# Patient Record
Sex: FEMALE | Race: WHITE | NOT HISPANIC OR LATINO | Employment: OTHER | URBAN - METROPOLITAN AREA
[De-identification: names, ages, dates, MRNs, and addresses within clinical notes are randomized per-mention and may not be internally consistent; named-entity substitution may affect disease eponyms.]

---

## 2018-03-15 ENCOUNTER — OFFICE VISIT (OUTPATIENT)
Dept: RADIATION ONCOLOGY | Facility: CLINIC | Age: 83
End: 2018-03-15
Payer: MEDICARE

## 2018-03-15 VITALS
WEIGHT: 159.5 LBS | TEMPERATURE: 98 F | HEART RATE: 50 BPM | DIASTOLIC BLOOD PRESSURE: 72 MMHG | RESPIRATION RATE: 18 BRPM | HEIGHT: 67 IN | BODY MASS INDEX: 25.03 KG/M2 | SYSTOLIC BLOOD PRESSURE: 125 MMHG

## 2018-03-15 DIAGNOSIS — C44.319 BASAL CELL CARCINOMA OF FOREHEAD: ICD-10-CM

## 2018-03-15 PROCEDURE — 99213 OFFICE O/P EST LOW 20 MIN: CPT | Mod: ,,, | Performed by: RADIOLOGY

## 2018-03-15 RX ORDER — FERROUS SULFATE, DRIED 160(50) MG
1 TABLET, EXTENDED RELEASE ORAL 2 TIMES DAILY WITH MEALS
COMMUNITY
End: 2020-03-11

## 2018-03-15 RX ORDER — SIMVASTATIN 10 MG/1
10 TABLET, FILM COATED ORAL NIGHTLY
COMMUNITY
End: 2020-03-11 | Stop reason: ALTCHOICE

## 2018-03-15 NOTE — PROGRESS NOTES
Kojo Villa  [id number]  9/20/1934  3/15/2018  [facility]  [address] [address]    DIAGNOSIS: Skin cancer of the for head  REASON FOR VISIT: Routine scheduled follow-up.    HISTORY OF PRESENT ILLNESS:   83-year-old patient with a prior history of carcinoma of the scalp treated a year ago to 2 sites of the scalp located on the vertex and the anterior scalp. She received 50 gray in 20 fractions. She had a complete spot to this treatment.  She is been seen by her dermatologist , who discovered a new lesion on the forehead area. This was biopsied showing basal cell carcinoma.    The patient resides by herself. She is accompanied by her son. He states she does have some memory difficulties but is able to understand my discussion and the side effects of radiation treatment.    INTERVAL HISTORY:   Since the biopsy she has done well. She reports no pain or inflammation with respect to the forehead or scalp area.      Review of Systems   Constitutional: Negative for appetite change and chills.   HENT:   Negative for hearing loss and lump/mass.    Eyes: Negative for eye problems and icterus.   Respiratory: Negative for chest tightness and cough.    Cardiovascular: Negative for chest pain and leg swelling.   Gastrointestinal: Negative for abdominal distention and abdominal pain.   Genitourinary: Negative for dysuria and hematuria.    Musculoskeletal: Negative for arthralgias, back pain and gait problem.   Skin: Negative for itching and rash.   Neurological: Negative for dizziness and gait problem.   Hematological: Negative for adenopathy. Does not bruise/bleed easily.   Psychiatric/Behavioral: Negative for confusion. The patient is not nervous/anxious.      Past Medical History:   Diagnosis Date    Colon polyps     Skin cancer      No past surgical history on file.  Social History     Social History    Marital status:      Spouse name: N/A    Number of  "children: N/A    Years of education: N/A     Social History Main Topics    Smoking status: Never Smoker    Smokeless tobacco: Never Used    Alcohol use 0.6 oz/week     1 Glasses of wine per week      Comment: with dinner occasionally    Drug use: No    Sexual activity: No     Other Topics Concern    None     Social History Narrative    None     Family History   Problem Relation Age of Onset    Cancer Mother     Cancer Sister     Cancer Brother      Medication List with Changes/Refills   Current Medications    CALCIUM-VITAMIN D3 (CALCIUM 500 + D) 500 MG(1,250MG) -200 UNIT PER TABLET    Take 1 tablet by mouth 2 (two) times daily with meals.    SIMVASTATIN (ZOCOR) 10 MG TABLET    Take 10 mg by mouth every evening.     Review of patient's allergies indicates:   Allergen Reactions    Sulfa (sulfonamide antibiotics) Other (See Comments)     Mother told her when she was a child she was allergic       QUALITY OF LIFE: 70%- Cares for Self: Unable to Carry on Normal Activity or Active Work    Vitals:    03/15/18 1002   BP: 125/72   Pulse: (!) 50   Resp: 18   Temp: 97.5 °F (36.4 °C)   TempSrc: Oral   Weight: 72.3 kg (159 lb 8 oz)   Height: 5' 7" (1.702 m)   PainSc: 0-No pain       PHYSICAL EXAM: Oriented alert answers questions well  GENERAL: alert; in no apparent distress.   HEAD: normocephalic, atraumatic.  EYES: pupils are equal, round, reactive to light and accommodation. Sclera anicteric. Conjunctiva not injected.   NOSE/THROAT: no nasal erythema or rhinorrhea. Oropharynx pink, without erythema, ulcerations or thrush.   NECK: no cervical motion rigidity; supple with no masses.  CHEST: clear to auscultation bilaterally; no wheezes, crackles or rubs. Patient is speaking comfortably on room air with normal work of breathing without using accessory muscles of respiration.  CARDIOVASCULAR: regular rate and rhythm; no murmurs, rubs or gallops.  ABDOMEN: soft, nontender, nondistended. Bowel sounds present. "   MUSCULOSKELETAL: no tenderness to palpation along the spine or scapulae. Normal range of motion.  NEUROLOGIC: cranial nerves II-XII intact bilaterally. Strength 5/5 in bilateral upper and lower extremities. No sensory deficits appreciated. Reflexes globally intact. No cerebellar signs. Normal gait.  LYMPHATIC: no cervical, supraclavicular or axillary adenopathy appreciated bilaterally.   EXTREMITIES: no clubbing, cyanosis, edema.  SKIN: no erythema, rashes , prior lesions on the scalp have healed over. There is some hair loss. Some minor subcutaneous scarring is noted but no evidence of residual disease. With aspect of the for head there is a suspicious area at the hairline on the right. Is unclear if this is at the actual lesion.    ANCILLARY DATA:     ASSESSMENT: 83-year-old patient with a prior history of skin cancers the scalp basal cell type now with a new cell carcinoma on the forehead, stage I  PLAN:  I discussed at length with the patient and her son the rationale for radiation therapy. I told that this is done with curative intent. I told that the results or essentially equivalent to have an surgery. I explained that I would prefer to have the dermatologist draw the lesion on her for head accurate target planning.  I discloses side effects of treatment which includes redness of the skin irritation possible desquamation and hair loss in the surrounding area which may be permanent to a certain degree.    I recommend 50 gray in 20 fractions.        All questions answered and contact information provided. Patient understands free to call us anytime with any questions or concerns regarding radiation therapy.      PHYSICIAN: Wolf Medina MD

## 2018-11-08 ENCOUNTER — OFFICE VISIT (OUTPATIENT)
Dept: ORTHOPEDICS | Facility: CLINIC | Age: 83
End: 2018-11-08
Payer: MEDICARE

## 2018-11-08 VITALS
DIASTOLIC BLOOD PRESSURE: 60 MMHG | WEIGHT: 155 LBS | HEIGHT: 67 IN | BODY MASS INDEX: 24.33 KG/M2 | SYSTOLIC BLOOD PRESSURE: 100 MMHG

## 2018-11-08 DIAGNOSIS — M51.36 LUMBAR DEGENERATIVE DISC DISEASE: Primary | ICD-10-CM

## 2018-11-08 PROCEDURE — 1101F PT FALLS ASSESS-DOCD LE1/YR: CPT | Mod: ,,, | Performed by: ORTHOPAEDIC SURGERY

## 2018-11-08 PROCEDURE — 20551 NJX 1 TENDON ORIGIN/INSJ: CPT | Mod: ,,, | Performed by: ORTHOPAEDIC SURGERY

## 2018-11-08 PROCEDURE — 99203 OFFICE O/P NEW LOW 30 MIN: CPT | Mod: 25,,, | Performed by: ORTHOPAEDIC SURGERY

## 2018-11-08 RX ORDER — TRAMADOL HYDROCHLORIDE 50 MG/1
50 TABLET ORAL EVERY 6 HOURS PRN
Qty: 28 TABLET | Refills: 0 | Status: SHIPPED | OUTPATIENT
Start: 2018-11-08 | End: 2018-11-15

## 2018-11-08 RX ORDER — DONEPEZIL HYDROCHLORIDE 10 MG/1
TABLET, FILM COATED ORAL
Refills: 1 | COMMUNITY
Start: 2018-10-11 | End: 2020-03-11 | Stop reason: SDUPTHER

## 2018-11-08 RX ORDER — METHYLPREDNISOLONE ACETATE 40 MG/ML
40 INJECTION, SUSPENSION INTRA-ARTICULAR; INTRALESIONAL; INTRAMUSCULAR; SOFT TISSUE
Status: DISCONTINUED | OUTPATIENT
Start: 2018-11-08 | End: 2018-11-08 | Stop reason: HOSPADM

## 2018-11-08 RX ORDER — TRIAMCINOLONE ACETONIDE 1 MG/G
CREAM TOPICAL
Refills: 0 | COMMUNITY
Start: 2018-09-12 | End: 2020-03-11 | Stop reason: SDUPTHER

## 2018-11-08 RX ORDER — MEMANTINE HYDROCHLORIDE 10 MG/1
TABLET ORAL
Refills: 1 | COMMUNITY
Start: 2018-10-11 | End: 2020-03-11 | Stop reason: SDUPTHER

## 2018-11-08 RX ORDER — SIMVASTATIN 40 MG/1
TABLET, FILM COATED ORAL
Refills: 1 | COMMUNITY
Start: 2018-08-17 | End: 2020-03-11 | Stop reason: SDUPTHER

## 2018-11-08 RX ORDER — ZINC GLUCONATE 50 MG
TABLET ORAL
Refills: 0 | COMMUNITY
Start: 2018-10-11

## 2018-11-08 RX ADMIN — METHYLPREDNISOLONE ACETATE 40 MG: 40 INJECTION, SUSPENSION INTRA-ARTICULAR; INTRALESIONAL; INTRAMUSCULAR; SOFT TISSUE at 12:11

## 2018-11-08 NOTE — PROGRESS NOTES
[facility]    Subjective:     Chief Complaint:   Chief Complaint   Patient presents with    Lumbar Spine - Pain     Lumbar pain off and on x 1 month. Denies groin pain and it does not radiate.Limited walking. No bowel problems but she doesloose control of her bladder       Past Medical History:   Diagnosis Date    Colon polyps     Hyperlipemia     Skin cancer        Past Surgical History:   Procedure Laterality Date    SKIN CANCER EXCISION         Current Outpatient Medications   Medication Sig    calcium-vitamin D3 (CALCIUM 500 + D) 500 mg(1,250mg) -200 unit per tablet Take 1 tablet by mouth 2 (two) times daily with meals.    donepezil (ARICEPT) 10 MG tablet TK 1 T PO QD IN THE KRISTOPHER    memantine (NAMENDA) 10 MG Tab TK 1 T PO BID    simvastatin (ZOCOR) 10 MG tablet Take 10 mg by mouth every evening.    simvastatin (ZOCOR) 40 MG tablet TK 1 T PO QD IN THE KRISTOPHER    triamcinolone acetonide 0.1% (KENALOG) 0.1 % cream JAM THIN LAYER EXT AA BID    VITAMIN B-12 1000 MCG tablet TK 1 T PO D     No current facility-administered medications for this visit.        Review of patient's allergies indicates:   Allergen Reactions    Sulfa (sulfonamide antibiotics) Other (See Comments)     Mother told her when she was a child she was allergic       Family History   Problem Relation Age of Onset    Cancer Mother     Cancer Sister     Cancer Brother        Social History     Socioeconomic History    Marital status:      Spouse name: Not on file    Number of children: Not on file    Years of education: Not on file    Highest education level: Not on file   Social Needs    Financial resource strain: Not on file    Food insecurity - worry: Not on file    Food insecurity - inability: Not on file    Transportation needs - medical: Not on file    Transportation needs - non-medical: Not on file   Occupational History    Not on file   Tobacco Use    Smoking status: Never Smoker    Smokeless tobacco:  Never Used   Substance and Sexual Activity    Alcohol use: Yes     Alcohol/week: 0.6 oz     Types: 1 Glasses of wine per week     Comment: with dinner occasionally    Drug use: No    Sexual activity: No   Other Topics Concern    Not on file   Social History Narrative    Not on file       History of present illness: ***      Review of Systems:    Constitution: Negative for chills, fever, and sweats.  Negative for unexplained weight loss.    HENT:  Negative for headaches and blurry vision.    Cardiovascular:Negative for chest pain or irregular heart beat. Negative for hypertension.    Respiratory:  Negative for cough and shortness of breath.    Gastrointestinal: Negative for abdominal pain, heartburn, melena, nausea, and vomitting.    Genitourinary:  Negative bladder incontinence and dysuria.    Musculoskeletal:  See HPI for details.     Neurological: Negative for numbness.    Psychiatric/Behavioral: Negative for depression.  The patient is not nervous/anxious.      Endocrine: Negative for polyuria    Hematologic/Lymphatic: Negative for bleeding problem.  Does not bruise/bleed easily.    Skin: Negative for poor would healing and rash    Objective:      Physical Examination:    Vital Signs:    Vitals:    11/08/18 1121   BP: 100/60       Body mass index is 24.28 kg/m².    This a well-developed, well nourished patient in no acute distress.  They are alert and oriented and cooperative to examination.        ***  Pertinent New Results:    XRAY Report / Interpretation:   ***    Assessment/Plan:      ***      This note was created using Dragon voice recognition software that occasionally misinterpreted phrases or words.

## 2018-11-08 NOTE — PROGRESS NOTES
[facility]    Subjective:     Chief Complaint:   Chief Complaint   Patient presents with    Lumbar Spine - Pain     Lumbar pain off and on x 1 month. Denies groin pain and it does not radiate.Limited walking. No bowel problems        Past Medical History:   Diagnosis Date    Colon polyps     Hyperlipemia     Skin cancer        Past Surgical History:   Procedure Laterality Date    SKIN CANCER EXCISION         Current Outpatient Medications   Medication Sig    calcium-vitamin D3 (CALCIUM 500 + D) 500 mg(1,250mg) -200 unit per tablet Take 1 tablet by mouth 2 (two) times daily with meals.    donepezil (ARICEPT) 10 MG tablet TK 1 T PO QD IN THE KRISTOPHER    memantine (NAMENDA) 10 MG Tab TK 1 T PO BID    simvastatin (ZOCOR) 10 MG tablet Take 10 mg by mouth every evening.    simvastatin (ZOCOR) 40 MG tablet TK 1 T PO QD IN THE KRISTOPHER    triamcinolone acetonide 0.1% (KENALOG) 0.1 % cream JAM THIN LAYER EXT AA BID    VITAMIN B-12 1000 MCG tablet TK 1 T PO D     No current facility-administered medications for this visit.        Review of patient's allergies indicates:   Allergen Reactions    Sulfa (sulfonamide antibiotics) Other (See Comments)     Mother told her when she was a child she was allergic       Family History   Problem Relation Age of Onset    Cancer Mother     Cancer Sister     Cancer Brother        Social History     Socioeconomic History    Marital status:      Spouse name: Not on file    Number of children: Not on file    Years of education: Not on file    Highest education level: Not on file   Social Needs    Financial resource strain: Not on file    Food insecurity - worry: Not on file    Food insecurity - inability: Not on file    Transportation needs - medical: Not on file    Transportation needs - non-medical: Not on file   Occupational History    Not on file   Tobacco Use    Smoking status: Never Smoker    Smokeless tobacco: Never Used   Substance and Sexual  Activity    Alcohol use: Yes     Alcohol/week: 0.6 oz     Types: 1 Glasses of wine per week     Comment: with dinner occasionally    Drug use: No    Sexual activity: No   Other Topics Concern    Not on file   Social History Narrative    Not on file       History of present illness: Patient comes in today for the lumbar spine. She's had intermittent back pain for several weeks. The pain is primarily over the left hip.      Review of Systems:    Constitution: Negative for chills, fever, and sweats.  Negative for unexplained weight loss.    HENT:  Negative for headaches and blurry vision.    Cardiovascular:Negative for chest pain or irregular heart beat. Negative for hypertension.    Respiratory:  Negative for cough and shortness of breath.    Gastrointestinal: Negative for abdominal pain, heartburn, melena, nausea, and vomitting.    Genitourinary:  Negative bladder incontinence and dysuria.    Musculoskeletal:  See HPI for details.     Neurological: Negative for numbness.    Psychiatric/Behavioral: Negative for depression.  The patient is not nervous/anxious.      Endocrine: Negative for polyuria    Hematologic/Lymphatic: Negative for bleeding problem.  Does not bruise/bleed easily.    Skin: Negative for poor would healing and rash    Objective:      Physical Examination:    Vital Signs:    Vitals:    11/08/18 1121   BP: 100/60       Body mass index is 24.28 kg/m².    This a well-developed, well nourished patient in no acute distress.  They are alert and oriented and cooperative to examination.        Patient has negative straight leg raises. EHLs are intact. Deep tendon reflexes are intact. She can heel walk and toe walk. She has pain with flexion extension of the lumbar spine.  Pertinent New Results:    XRAY Report / Interpretation:   AP and lateral the lumbar spine demonstrate moderate degenerative changes of the lower lumbar segments. No fractures or subluxations    AP of the pelvis is within normal limits no  osteophyte arthritis or fractures    Assessment/Plan:      Lumbar degenerative disease. I injected the trigger points along the left lumbar spine an area with Depo-Medrol and lidocaine. Started her on physical therapy. She will follow-up in one month. She is given Ultram for pain control      This note was created using Dragon voice recognition software that occasionally misinterpreted phrases or words.

## 2018-12-20 ENCOUNTER — TELEPHONE (OUTPATIENT)
Dept: ORTHOPEDICS | Facility: CLINIC | Age: 83
End: 2018-12-20

## 2018-12-20 ENCOUNTER — OFFICE VISIT (OUTPATIENT)
Dept: ORTHOPEDICS | Facility: CLINIC | Age: 83
End: 2018-12-20
Payer: MEDICARE

## 2018-12-20 ENCOUNTER — TELEPHONE (OUTPATIENT)
Dept: PAIN MEDICINE | Facility: CLINIC | Age: 83
End: 2018-12-20

## 2018-12-20 VITALS
WEIGHT: 157 LBS | SYSTOLIC BLOOD PRESSURE: 114 MMHG | DIASTOLIC BLOOD PRESSURE: 72 MMHG | BODY MASS INDEX: 24.64 KG/M2 | HEART RATE: 63 BPM | HEIGHT: 67 IN

## 2018-12-20 DIAGNOSIS — M51.36 LUMBAR DEGENERATIVE DISC DISEASE: Primary | ICD-10-CM

## 2018-12-20 PROCEDURE — 1101F PT FALLS ASSESS-DOCD LE1/YR: CPT | Mod: ,,, | Performed by: ORTHOPAEDIC SURGERY

## 2018-12-20 PROCEDURE — 99213 OFFICE O/P EST LOW 20 MIN: CPT | Mod: ,,, | Performed by: ORTHOPAEDIC SURGERY

## 2018-12-20 NOTE — PROGRESS NOTES
[facility]    Subjective:     Chief Complaint:   Chief Complaint   Patient presents with    Lower Back - Pain     Lumbar pain/injection f/u. States that the only time she has pain is if she turns at night a certain way. States that other than that she is pretty good.        Past Medical History:   Diagnosis Date    Colon polyps     Hyperlipemia     Skin cancer        Past Surgical History:   Procedure Laterality Date    SKIN CANCER EXCISION         Current Outpatient Medications   Medication Sig    calcium-vitamin D3 (CALCIUM 500 + D) 500 mg(1,250mg) -200 unit per tablet Take 1 tablet by mouth 2 (two) times daily with meals.    donepezil (ARICEPT) 10 MG tablet TK 1 T PO QD IN THE KRISTOPHER    memantine (NAMENDA) 10 MG Tab TK 1 T PO BID    simvastatin (ZOCOR) 10 MG tablet Take 10 mg by mouth every evening.    simvastatin (ZOCOR) 40 MG tablet TK 1 T PO QD IN THE KRISTOPHER    triamcinolone acetonide 0.1% (KENALOG) 0.1 % cream JAM THIN LAYER EXT AA BID    VITAMIN B-12 1000 MCG tablet TK 1 T PO D     No current facility-administered medications for this visit.        Review of patient's allergies indicates:   Allergen Reactions    Sulfa (sulfonamide antibiotics) Other (See Comments)     Mother told her when she was a child she was allergic       Family History   Problem Relation Age of Onset    Cancer Mother     Cancer Sister     Cancer Brother        Social History     Socioeconomic History    Marital status:      Spouse name: Not on file    Number of children: Not on file    Years of education: Not on file    Highest education level: Not on file   Social Needs    Financial resource strain: Not on file    Food insecurity - worry: Not on file    Food insecurity - inability: Not on file    Transportation needs - medical: Not on file    Transportation needs - non-medical: Not on file   Occupational History    Not on file   Tobacco Use    Smoking status: Never Smoker    Smokeless tobacco:  Never Used   Substance and Sexual Activity    Alcohol use: Yes     Alcohol/week: 0.6 oz     Types: 1 Glasses of wine per week     Comment: with dinner occasionally    Drug use: No    Sexual activity: No   Other Topics Concern    Not on file   Social History Narrative    Not on file       History of present illness: Patient returns today for her lumbar spine. She complains of intermittent pain. States that is not an every day thing. She thinks injection and the physical therapy helped.      Review of Systems:    Constitution: Negative for chills, fever, and sweats.  Negative for unexplained weight loss.    HENT:  Negative for headaches and blurry vision.    Cardiovascular:Negative for chest pain or irregular heart beat. Negative for hypertension.    Respiratory:  Negative for cough and shortness of breath.    Gastrointestinal: Negative for abdominal pain, heartburn, melena, nausea, and vomitting.    Genitourinary:  Negative bladder incontinence and dysuria.    Musculoskeletal:  See HPI for details.     Neurological: Negative for numbness.    Psychiatric/Behavioral: Negative for depression.  The patient is not nervous/anxious.      Endocrine: Negative for polyuria    Hematologic/Lymphatic: Negative for bleeding problem.  Does not bruise/bleed easily.    Skin: Negative for poor would healing and rash    Objective:      Physical Examination:    Vital Signs:    Vitals:    12/20/18 1048   BP: 114/72   Pulse: 63       Body mass index is 24.59 kg/m².    This a well-developed, well nourished patient in no acute distress.  They are alert and oriented and cooperative to examination.        Patient has negative straight leg raises. EHLs are intact. Deep tendon reflexes are intact.  Pertinent New Results:    XRAY Report / Interpretation:   No new XRAYS Today.     Assessment/Plan:      Lumbar degenerative. Her pain is reasonably well-controlled. We're not can intervene. Should she develop further symptoms we will refer her  out for an epidural injection      This note was created using Dragon voice recognition software that occasionally misinterpreted phrases or words.

## 2019-01-08 ENCOUNTER — TELEPHONE (OUTPATIENT)
Dept: ORTHOPEDICS | Facility: CLINIC | Age: 84
End: 2019-01-08

## 2019-01-08 NOTE — TELEPHONE ENCOUNTER
Patient is still waiting on a referral to pain management.  Please call patient. Phone [phone]

## 2019-01-09 ENCOUNTER — TELEPHONE (OUTPATIENT)
Dept: ORTHOPEDICS | Facility: CLINIC | Age: 84
End: 2019-01-09

## 2019-01-09 DIAGNOSIS — M51.36 DEGENERATION OF LUMBAR INTERVERTEBRAL DISC: Primary | ICD-10-CM

## 2019-01-09 NOTE — TELEPHONE ENCOUNTER
Please call patients son Jose Villa @ [phone] about his mother not being contacted about going to a pain management

## 2019-01-23 ENCOUNTER — OFFICE VISIT (OUTPATIENT)
Dept: PAIN MEDICINE | Facility: CLINIC | Age: 84
End: 2019-01-23
Payer: MEDICARE

## 2019-01-23 VITALS
HEIGHT: 67 IN | BODY MASS INDEX: 25.11 KG/M2 | DIASTOLIC BLOOD PRESSURE: 69 MMHG | SYSTOLIC BLOOD PRESSURE: 111 MMHG | WEIGHT: 160 LBS | HEART RATE: 67 BPM

## 2019-01-23 DIAGNOSIS — F02.80 ALZHEIMER'S DEMENTIA WITHOUT BEHAVIORAL DISTURBANCE, UNSPECIFIED TIMING OF DEMENTIA ONSET: ICD-10-CM

## 2019-01-23 DIAGNOSIS — G30.9 ALZHEIMER'S DEMENTIA WITHOUT BEHAVIORAL DISTURBANCE, UNSPECIFIED TIMING OF DEMENTIA ONSET: ICD-10-CM

## 2019-01-23 DIAGNOSIS — M47.896 OTHER SPONDYLOSIS, LUMBAR REGION: ICD-10-CM

## 2019-01-23 DIAGNOSIS — M54.16 LUMBAR RADICULITIS: ICD-10-CM

## 2019-01-23 DIAGNOSIS — M51.36 DDD (DEGENERATIVE DISC DISEASE), LUMBAR: Primary | ICD-10-CM

## 2019-01-23 DIAGNOSIS — M54.16 LUMBAR RADICULOPATHY: Primary | ICD-10-CM

## 2019-01-23 PROCEDURE — 1101F PR PT FALLS ASSESS DOC 0-1 FALLS W/OUT INJ PAST YR: ICD-10-PCS | Mod: CPTII,S$GLB,, | Performed by: ANESTHESIOLOGY

## 2019-01-23 PROCEDURE — 1101F PT FALLS ASSESS-DOCD LE1/YR: CPT | Mod: CPTII,S$GLB,, | Performed by: ANESTHESIOLOGY

## 2019-01-23 PROCEDURE — 99999 PR PBB SHADOW E&M-EST. PATIENT-LVL III: ICD-10-PCS | Mod: PBBFAC,,, | Performed by: ANESTHESIOLOGY

## 2019-01-23 PROCEDURE — 99204 OFFICE O/P NEW MOD 45 MIN: CPT | Mod: S$GLB,,, | Performed by: ANESTHESIOLOGY

## 2019-01-23 PROCEDURE — 99204 PR OFFICE/OUTPT VISIT, NEW, LEVL IV, 45-59 MIN: ICD-10-PCS | Mod: S$GLB,,, | Performed by: ANESTHESIOLOGY

## 2019-01-23 PROCEDURE — 99999 PR PBB SHADOW E&M-EST. PATIENT-LVL III: CPT | Mod: PBBFAC,,, | Performed by: ANESTHESIOLOGY

## 2019-01-23 RX ORDER — HYDROCODONE BITARTRATE AND ACETAMINOPHEN 5; 325 MG/1; MG/1
1 TABLET ORAL EVERY 8 HOURS PRN
Qty: 30 TABLET | Refills: 0 | Status: SHIPPED | OUTPATIENT
Start: 2019-01-23 | End: 2019-02-22

## 2019-01-23 NOTE — H&P (VIEW-ONLY)
This note was completed with dictation software and grammatical errors may exist.    Referring Physician: Maximo Ponce MD    PCP: Amy Junior MD      CC:  Low back and left buttock pain    HPI:   Kojo Villa is a 84 y.o. female referred to us for low back and left buttock pain. She has history of dementia is accompanied by her son today.  Pain has been present for many years but has gradually worsened over the past 6 months.  She has intermittent daily tight and aching pain over her left lower back.  Pain radiates to her left buttock and left hip.  Pain worsens with sitting, walking, getting up.  Pain improves with rest.  She has tried physical therapy with minimal benefit.  She has taken ibuprofen, tramadol with minimal benefit.  She denies any worsening weakness.  No bowel bladder changes.    ROS:  CONSTITUTIONAL: No fevers, chills, night sweats, wt. loss, appetite changes  SKIN: no rashes or itching  ENT: No headaches, head trauma, vision changes, or eye pain  LYMPH NODES: None noticed   CV: No chest pain, palpitations.   RESP: No shortness of breath, dyspnea on exertion, cough, wheezing, or hemoptysis  GI: No nausea, emesis, diarrhea, constipation, melena, hematochezia, pain.    : No dysuria, hematuria, urgency, or frequency   HEME: No easy bruising, bleeding problems  PSYCHIATRIC: No depression, anxiety, psychosis, hallucinations.  NEURO: No seizures, memory loss, dizziness or difficulty sleeping  MSK:  Positive HPI      Past Medical History:   Diagnosis Date    Colon polyps     Hyperlipemia     Skin cancer      Past Surgical History:   Procedure Laterality Date    SKIN CANCER EXCISION       Family History   Problem Relation Age of Onset    Cancer Mother     Cancer Sister     Cancer Brother      Social History     Socioeconomic History    Marital status:      Spouse name: None    Number of children: None    Years of education: None    Highest education level: None  "  Social Needs    Financial resource strain: None    Food insecurity - worry: None    Food insecurity - inability: None    Transportation needs - medical: None    Transportation needs - non-medical: None   Occupational History    None   Tobacco Use    Smoking status: Never Smoker    Smokeless tobacco: Never Used   Substance and Sexual Activity    Alcohol use: Yes     Alcohol/week: 0.6 oz     Types: 1 Glasses of wine per week     Comment: with dinner occasionally    Drug use: No    Sexual activity: No   Other Topics Concern    None   Social History Narrative    None         Medications/Allergies: See med card    Vitals:    01/23/19 1409   BP: 111/69   Pulse: 67   Weight: 72.6 kg (160 lb)   Height: 5' 7" (1.702 m)   PainSc:   4   PainLoc: Back         Physical exam:    GENERAL: A and O x3, the patient appears well groomed and is in no acute distress.  Skin: No rashes or obvious lesions  HEENT: normocephalic, atraumatic  CARDIOVASCULAR:  Palpable peripheral pulses  LUNGS: easy work of breathing  ABDOMEN: soft, nontender   UPPER EXTREMITIES: Normal alignment, normal range of motion, no atrophy, no skin changes,  hair growth and nail growth normal and equal bilaterally. No swelling, no tenderness.    LOWER EXTREMITIES:  Normal alignment, normal range of motion, no atrophy, no skin changes,  hair growth and nail growth normal and equal bilaterally. No swelling, no tenderness.  LUMBAR SPINE  Lumbar spine: ROM is mildly limited with flexion extension and oblique extension with moderate increased pain.    Toni's test causes no increased pain on either side.    Supine straight leg raise is positive left of 60°   Internal and external rotation of the hip causes no increased pain on either side.  Myofascial exam: No tenderness to palpation across lumbar paraspinous muscles.      MENTAL STATUS: normal orientation, speech, language, and fund of knowledge for social situation.  Emotional state " appropriate.    CRANIAL NERVES:  II:  PERRL bilaterally,   III,IV,VI: EOMI.    V:  Facial sensation equal bilaterally  VII:  Facial motor function normal.  VIII:  Hearing equal to finger rub bilaterally  IX/X: Gag normal, palate symmetric  XI:  Shoulder shrug equal, head turn equal  XII:  Tongue midline without fasciculations      MOTOR: Tone and bulk: normal bilateral upper and lower Strength: normal   Delt Bi Tri WE WF     R 5 5 5 5 5 5   L 5 5 5 5 5 5     IP ADD ABD Quad TA Gas HAM  R 5 5 5 5 5 5 5  L 5 5 5 5 5 5 5    SENSATION: Light touch and pinprick intact bilaterally  REFLEXES: normal, symmetric, nonbrisk.  Toes down, no clonus. No hoffmans.  GAIT:  Uses walker for assistance at times       Imaging:  None    Assessment:  Patient presents with low back and left leg pain  1. DDD (degenerative disc disease), lumbar    2. Other spondylosis, lumbar region    3. Lumbar radiculitis    4. Alzheimer's dementia without behavioral disturbance, unspecified timing of dementia onset          Plan:  1. I have stressed the importance of physical activity and exercise to improve overall health  2. I think that the patient's back pain and radicular leg symptoms are due to degenerative disc disease and have recommended a lumbar epidural steroid injection to the L5-S1 level(s).  3. May consider lumbar MBB if above is not helpful  4. She can take norco 5mg as needed for acute flares of pain.  Hold for sedation  5. Follow up after procedure      Thank you for referring this interesting patient, and I look forward to continuing to collaborate in her care.

## 2019-02-07 ENCOUNTER — HOSPITAL ENCOUNTER (OUTPATIENT)
Facility: AMBULARY SURGERY CENTER | Age: 84
Discharge: HOME | End: 2019-02-07
Attending: ANESTHESIOLOGY | Admitting: ANESTHESIOLOGY
Payer: MEDICARE

## 2019-02-07 DIAGNOSIS — M54.16 LUMBAR RADICULITIS: Primary | ICD-10-CM

## 2019-02-07 PROCEDURE — 62323 PR INJ LUMBAR/SACRAL, W/IMAGING GUIDANCE: ICD-10-PCS | Mod: ,,, | Performed by: ANESTHESIOLOGY

## 2019-02-07 PROCEDURE — 62323 NJX INTERLAMINAR LMBR/SAC: CPT | Performed by: ANESTHESIOLOGY

## 2019-02-07 PROCEDURE — 62323 NJX INTERLAMINAR LMBR/SAC: CPT | Mod: ,,, | Performed by: ANESTHESIOLOGY

## 2019-02-07 RX ORDER — LIDOCAINE HYDROCHLORIDE 10 MG/ML
INJECTION, SOLUTION EPIDURAL; INFILTRATION; INTRACAUDAL; PERINEURAL
Status: DISCONTINUED | OUTPATIENT
Start: 2019-02-07 | End: 2019-02-07 | Stop reason: HOSPADM

## 2019-02-07 RX ORDER — SODIUM CHLORIDE, SODIUM LACTATE, POTASSIUM CHLORIDE, CALCIUM CHLORIDE 600; 310; 30; 20 MG/100ML; MG/100ML; MG/100ML; MG/100ML
INJECTION, SOLUTION INTRAVENOUS ONCE AS NEEDED
Status: DISCONTINUED | OUTPATIENT
Start: 2019-02-07 | End: 2022-01-01

## 2019-02-07 RX ORDER — DEXAMETHASONE SODIUM PHOSPHATE 10 MG/ML
INJECTION INTRAMUSCULAR; INTRAVENOUS
Status: DISCONTINUED
Start: 2019-02-07 | End: 2019-02-07 | Stop reason: HOSPADM

## 2019-02-07 RX ORDER — SODIUM CHLORIDE 9 MG/ML
INJECTION, SOLUTION INTRAMUSCULAR; INTRAVENOUS; SUBCUTANEOUS
Status: DISCONTINUED | OUTPATIENT
Start: 2019-02-07 | End: 2019-02-07 | Stop reason: HOSPADM

## 2019-02-07 RX ORDER — DEXAMETHASONE SODIUM PHOSPHATE 10 MG/ML
INJECTION INTRAMUSCULAR; INTRAVENOUS
Status: DISCONTINUED | OUTPATIENT
Start: 2019-02-07 | End: 2019-02-07 | Stop reason: HOSPADM

## 2019-02-07 NOTE — PLAN OF CARE
Patient sitting in chair and able to stand up without dizziness.Patient states she is  ready to go home; denies pain nausea or any weakness. Patient's son Jose Cruz present and states he is ready to take patient home and that he is driving the patient home. All pt belongings,blouse pants jacket shoes and scarf and patient's purse returned to patient. Patient is wearing her watch.

## 2019-02-07 NOTE — DISCHARGE SUMMARY
[facility]  Discharge Note  Short Stay    Admit Date: 2/7/2019    Discharge Date and Time: 2/7/2019    Attending Physician: Jose Escalante MD     Discharge Provider: Jose Escalante    Diagnoses:  Active Hospital Problems    Diagnosis  POA    *Lumbar radiculitis [M54.16]  Yes      Resolved Hospital Problems   No resolved problems to display.       Hospital Course: Lumbar KARINA  Discharged Condition: Good    Final Diagnoses:   Active Hospital Problems    Diagnosis  POA    *Lumbar radiculitis [M54.16]  Yes      Resolved Hospital Problems   No resolved problems to display.       Disposition: Home or Self Care    Follow up/Patient Instructions:    Medications:  Reconciled Home Medications:      Medication List      CONTINUE taking these medications    CALCIUM 500 + D 500 mg(1,250mg) -200 unit per tablet  Generic drug:  calcium-vitamin D3  Take 1 tablet by mouth 2 (two) times daily with meals.     donepezil 10 MG tablet  Commonly known as:  ARICEPT  TK 1 T PO QD IN THE KRISTOPHER     HYDROcodone-acetaminophen 5-325 mg per tablet  Commonly known as:  NORCO  Take 1 tablet by mouth every 8 (eight) hours as needed for Pain.     memantine 10 MG Tab  Commonly known as:  NAMENDA  TK 1 T PO BID     * simvastatin 40 MG tablet  Commonly known as:  ZOCOR  TK 1 T PO QD IN THE KRISTOPHER     * simvastatin 10 MG tablet  Commonly known as:  ZOCOR  Take 10 mg by mouth every evening.     triamcinolone acetonide 0.1% 0.1 % cream  Commonly known as:  KENALOG  JAM THIN LAYER EXT AA BID     VITAMIN B-12 1000 MCG tablet  Generic drug:  cyanocobalamin  TK 1 T PO D         * This list has 2 medication(s) that are the same as other medications prescribed for you. Read the directions carefully, and ask your doctor or other care provider to review them with you.              Discharge Procedure Orders   Call MD for:  temperature >100.4     Call MD for:  persistent nausea and vomiting or diarrhea     Call MD for:  severe uncontrolled pain     Call MD for:   redness, tenderness, or signs of infection (pain, swelling, redness, odor or green/yellow discharge around incision site)     Call MD for:  difficulty breathing or increased cough     Call MD for:  severe persistent headache        Follow up with MD in 2-3 weeks    Discharge Procedure Orders (must include Diet, Follow-up, Activity):   Discharge Procedure Orders (must include Diet, Follow-up, Activity)   Call MD for:  temperature >100.4     Call MD for:  persistent nausea and vomiting or diarrhea     Call MD for:  severe uncontrolled pain     Call MD for:  redness, tenderness, or signs of infection (pain, swelling, redness, odor or green/yellow discharge around incision site)     Call MD for:  difficulty breathing or increased cough     Call MD for:  severe persistent headache

## 2019-02-08 VITALS
OXYGEN SATURATION: 93 % | WEIGHT: 157 LBS | TEMPERATURE: 98 F | BODY MASS INDEX: 24.64 KG/M2 | RESPIRATION RATE: 18 BRPM | DIASTOLIC BLOOD PRESSURE: 71 MMHG | SYSTOLIC BLOOD PRESSURE: 137 MMHG | HEART RATE: 50 BPM | HEIGHT: 67 IN

## 2019-02-28 ENCOUNTER — OFFICE VISIT (OUTPATIENT)
Dept: PAIN MEDICINE | Facility: CLINIC | Age: 84
End: 2019-02-28
Payer: MEDICARE

## 2019-02-28 VITALS
DIASTOLIC BLOOD PRESSURE: 67 MMHG | HEIGHT: 67 IN | SYSTOLIC BLOOD PRESSURE: 100 MMHG | HEART RATE: 66 BPM | BODY MASS INDEX: 24.64 KG/M2 | WEIGHT: 157 LBS

## 2019-02-28 DIAGNOSIS — M51.36 DDD (DEGENERATIVE DISC DISEASE), LUMBAR: Primary | ICD-10-CM

## 2019-02-28 DIAGNOSIS — M54.16 LUMBAR RADICULITIS: ICD-10-CM

## 2019-02-28 DIAGNOSIS — M47.896 OTHER SPONDYLOSIS, LUMBAR REGION: ICD-10-CM

## 2019-02-28 PROCEDURE — 99999 PR PBB SHADOW E&M-EST. PATIENT-LVL III: CPT | Mod: PBBFAC,,, | Performed by: PHYSICIAN ASSISTANT

## 2019-02-28 PROCEDURE — 1101F PT FALLS ASSESS-DOCD LE1/YR: CPT | Mod: CPTII,S$GLB,, | Performed by: PHYSICIAN ASSISTANT

## 2019-02-28 PROCEDURE — 99214 OFFICE O/P EST MOD 30 MIN: CPT | Mod: S$GLB,,, | Performed by: PHYSICIAN ASSISTANT

## 2019-02-28 PROCEDURE — 1101F PR PT FALLS ASSESS DOC 0-1 FALLS W/OUT INJ PAST YR: ICD-10-PCS | Mod: CPTII,S$GLB,, | Performed by: PHYSICIAN ASSISTANT

## 2019-02-28 PROCEDURE — 99214 PR OFFICE/OUTPT VISIT, EST, LEVL IV, 30-39 MIN: ICD-10-PCS | Mod: S$GLB,,, | Performed by: PHYSICIAN ASSISTANT

## 2019-02-28 PROCEDURE — 99999 PR PBB SHADOW E&M-EST. PATIENT-LVL III: ICD-10-PCS | Mod: PBBFAC,,, | Performed by: PHYSICIAN ASSISTANT

## 2019-02-28 NOTE — PROGRESS NOTES
Referring Physician: No ref. provider found    PCP: Amy Junior MD      CC:  Low back and left buttock pain    Interval History:  Kojo Villa is a 84 y.o. female with low back and left buttock pain who presents today for f/u s/p IESI at L5-S1. Reports 90% relief of her symptoms. She is happy with her results. She has no new complaints today. Denies b/b changes. Pain today is rated 0/10.  All medication management was performed by Dr. Jose Escalante    HPI:   Kojo Villa is a 84 y.o. female referred to us for low back and left buttock pain. She has history of dementia is accompanied by her son today.  Pain has been present for many years but has gradually worsened over the past 6 months.  She has intermittent daily tight and aching pain over her left lower back.  Pain radiates to her left buttock and left hip.  Pain worsens with sitting, walking, getting up.  Pain improves with rest.  She has tried physical therapy with minimal benefit.  She has taken ibuprofen, tramadol with minimal benefit.  She denies any worsening weakness.  No bowel bladder changes.    ROS:  CONSTITUTIONAL: No fevers, chills, night sweats, wt. loss, appetite changes  SKIN: no rashes or itching  ENT: No headaches, head trauma, vision changes, or eye pain  LYMPH NODES: None noticed   CV: No chest pain, palpitations.   RESP: No shortness of breath, dyspnea on exertion, cough, wheezing, or hemoptysis  GI: No nausea, emesis, diarrhea, constipation, melena, hematochezia, pain.    : No dysuria, hematuria, urgency, or frequency   HEME: No easy bruising, bleeding problems  PSYCHIATRIC: No depression, anxiety, psychosis, hallucinations.  NEURO: No seizures, memory loss, dizziness or difficulty sleeping  MSK:  Positive HPI      Past Medical History:   Diagnosis Date    Back pain     Colon polyps     Hyperlipemia     Skin cancer     skin     Past Surgical History:   Procedure Laterality Date    Injection-steroid-epidural-lumbar N/A  "2/7/2019    Performed by Jose Esc[facility]ante MD at [facility] OR    SKIN CANCER EXCISION       Family History   Problem Relation Age of Onset    Cancer Mother     Cancer Sister     Cancer Brother      Social History     Socioeconomic History    Marital status:      Spouse name: None    Number of children: None    Years of education: None    Highest education level: None   Social Needs    Financial resource strain: None    Food insecurity - worry: None    Food insecurity - inability: None    Transportation needs - medical: None    Transportation needs - non-medical: None   Occupational History    None   Tobacco Use    Smoking status: Never Smoker    Smokeless tobacco: Never Used   Substance and Sexual Activity    Alcohol use: Yes     Alcohol/week: 0.6 oz     Types: 1 Glasses of wine per week     Comment: with dinner occasionally    Drug use: No    Sexual activity: No   Other Topics Concern    None   Social History Narrative    None         Medications/Allergies: See med card    Vitals:    02/28/19 0936   BP: 100/67   Pulse: 66   Weight: 71.2 kg (157 lb)   Height: 5' 7" (1.702 m)   PainSc: 0-No pain   PainLoc: Back         Physical exam:    GENERAL: A and O x3, the patient appears well groomed and is in no acute distress.  Skin: No rashes or obvious lesions  HEENT: normocephalic, atraumatic  CARDIOVASCULAR:  RRR  LUNGS: non labored breathing  ABDOMEN: soft, nontender   UPPER EXTREMITIES: Normal alignment, normal range of motion, no atrophy, no skin changes,  hair growth and nail growth normal and equal bilaterally. No swelling, no tenderness.    LOWER EXTREMITIES:  Normal alignment, normal range of motion, no atrophy, no skin changes,  hair growth and nail growth normal and equal bilaterally. No swelling, no tenderness.  LUMBAR SPINE  Lumbar spine: ROM is mildly limited with flexion extension and oblique extension with moderate increased pain.    Toni's test causes no increased pain on either side.  "   Supine straight leg raise is negative bilaterally  Internal and external rotation of the hip causes no increased pain on either side.  Myofascial exam: No tenderness to palpation across lumbar paraspinous muscles.      MENTAL STATUS: normal orientation, speech, language, and fund of knowledge for social situation.  Emotional state appropriate.    CRANIAL NERVES:  II:  PERRL bilaterally,   III,IV,VI: EOMI.    V:  Facial sensation equal bilaterally  VII:  Facial motor function normal.  VIII:  Hearing equal to finger rub bilaterally  IX/X: Gag normal, palate symmetric  XI:  Shoulder shrug equal, head turn equal  XII:  Tongue midline without fasciculations      MOTOR: Tone and bulk: normal bilateral upper and lower Strength: normal   Delt Bi Tri WE WF     R 5 5 5 5 5 5   L 5 5 5 5 5 5     IP ADD ABD Quad TA Gas HAM  R 5 5 5 5 5 5 5  L 5 5 5 5 5 5 5    SENSATION: Light touch and pinprick intact bilaterally  REFLEXES: normal, symmetric, nonbrisk.  Toes down, no clonus. No hoffmans.  GAIT:  Uses walker for assistance at times       Imaging:  None    Assessment:  Kojo Villa is a 84 y.o. female with low back and left leg pain  1. DDD (degenerative disc disease), lumbar    2. Other spondylosis, lumbar region    3. Lumbar radiculitis          Plan:  1. I have stressed the importance of physical activity and exercise to improve overall health  2. Monitor progress and consider repeat lumbar epidural steroid injection to the L5-S1 level(s).  3. She does not request any medication today  4. F/u prn

## 2019-04-15 NOTE — PROCEDURES
Tendon Origin  Date/Time: 11/8/2018 12:14 PM  Performed by: Jesús Campos MD  Authorized by: Jesús Campos MD     Consent Done?:  Yes (Verbal)  Timeout: prior to procedure the correct patient, procedure, and site was verified    Indications:  Pain  Timeout: prior to procedure the correct patient, procedure, and site was verified    Location: Rt sided lumbar.  Needle size:  25 G  Medications:  40 mg methylPREDNISolone acetate 40 mg/mL; 40 mg methylPREDNISolone acetate 40 mg/mL  Patient tolerance:  Patient tolerated the procedure well with no immediate complications       RUL wedge

## 2020-01-30 NOTE — DISCHARGE INSTRUCTIONS
Before leaving, please make sure you have all your personal belongings such as glasses, purses, wallets, keys, cell phones, jewelry, jackets etc  Anesthesia information    Anesthesia Safety      You have been given medicine  to sedate you during your procedure today. This may have included both a pain medicine and sleeping medicine. Most of the effects have worn off; however, you may continue to have some drowsiness for the next  24 hours. Anesthesia and pain medicines can cause nausea, sleepiness, dizziness and  constipation.    HOME CARE:  1) For the next EIGHT HOURS, you should be watched by a responsible adult to look for any worsening of your condition.  2) DO NOT DRINK any ALCOHOL for the next 24 HOURS.  3) DO NOT DRIVE or operate dangerous machinery during the next 24 HOURS.  FOLLOW UP with your doctor or this facility if you are not alert and back to your usual level of activity within 24 hrs.  GET PROMPT MEDICAL ATTENTION if any of the following occur:  -- Increased drowsiness  -- Increased weakness or dizziness  -- Repeated vomiting  -- If you cannot be awakened    Pain injection instructions:     This procedure may take a couple weeks to relieve pain    No driving for 24 hrs.   Activity as tolerated- gradually increase activities.  Dont lift over 10 lbs for 24 hrs   No heat at injection sites x 2 days. No heating pads, hot tubs, saunas, or swimming in any body of water or pool for 2 days.  Use ice pack for mild swelling and for comfort , apply for 20 minutes, remove for 20 minute intervals. No direct contact of ice itself  to skin.  May shower today. Do not allow shower water to hit injection site for 2 days. No tub baths for two days.      Resume Aspirin, Plavix, or Coumadin the day after the procedure unless otherwise instructed.   If diabetic,monitor your glucose carefully as steroids can increase your glucose level    Seek immediate medical help for:   Severe increase in your usual pain or appearance  of new pain.  Prolonged (more than 8 hours) or increasing weakness or numbness in the legs or arms.  .    Fever above 100.4F ,Drainage,redness,active bleeding, or increased swelling at the injection site.  Headache, shortness of breath, chest pain, or breathing problems.         <<----- Click to add NO significant Past Surgical History

## 2020-03-11 ENCOUNTER — OFFICE VISIT (OUTPATIENT)
Dept: FAMILY MEDICINE | Facility: CLINIC | Age: 85
End: 2020-03-11
Payer: MEDICARE

## 2020-03-11 VITALS
SYSTOLIC BLOOD PRESSURE: 118 MMHG | WEIGHT: 153 LBS | BODY MASS INDEX: 26.12 KG/M2 | HEART RATE: 72 BPM | HEIGHT: 64 IN | DIASTOLIC BLOOD PRESSURE: 64 MMHG

## 2020-03-11 DIAGNOSIS — L30.9 DERMATITIS: ICD-10-CM

## 2020-03-11 DIAGNOSIS — F02.80 ALZHEIMER'S DEMENTIA WITHOUT BEHAVIORAL DISTURBANCE, UNSPECIFIED TIMING OF DEMENTIA ONSET: Primary | ICD-10-CM

## 2020-03-11 DIAGNOSIS — E78.2 MIXED HYPERLIPIDEMIA: ICD-10-CM

## 2020-03-11 DIAGNOSIS — G30.9 ALZHEIMER'S DEMENTIA WITHOUT BEHAVIORAL DISTURBANCE, UNSPECIFIED TIMING OF DEMENTIA ONSET: Primary | ICD-10-CM

## 2020-03-11 DIAGNOSIS — C44.319 BASAL CELL CARCINOMA OF FOREHEAD: ICD-10-CM

## 2020-03-11 PROCEDURE — 1159F MED LIST DOCD IN RCRD: CPT | Mod: S$GLB,,, | Performed by: FAMILY MEDICINE

## 2020-03-11 PROCEDURE — 99203 PR OFFICE/OUTPT VISIT, NEW, LEVL III, 30-44 MIN: ICD-10-PCS | Mod: S$GLB,,, | Performed by: FAMILY MEDICINE

## 2020-03-11 PROCEDURE — 1159F PR MEDICATION LIST DOCUMENTED IN MEDICAL RECORD: ICD-10-PCS | Mod: S$GLB,,, | Performed by: FAMILY MEDICINE

## 2020-03-11 PROCEDURE — 99203 OFFICE O/P NEW LOW 30 MIN: CPT | Mod: S$GLB,,, | Performed by: FAMILY MEDICINE

## 2020-03-11 PROCEDURE — 1101F PT FALLS ASSESS-DOCD LE1/YR: CPT | Mod: S$GLB,,, | Performed by: FAMILY MEDICINE

## 2020-03-11 PROCEDURE — 1101F PR PT FALLS ASSESS DOC 0-1 FALLS W/OUT INJ PAST YR: ICD-10-PCS | Mod: S$GLB,,, | Performed by: FAMILY MEDICINE

## 2020-03-11 RX ORDER — TRIAMCINOLONE ACETONIDE 1 MG/G
CREAM TOPICAL 2 TIMES DAILY PRN
Qty: 45 G | Refills: 5 | Status: SHIPPED | OUTPATIENT
Start: 2020-03-11 | End: 2021-03-10 | Stop reason: SDUPTHER

## 2020-03-11 RX ORDER — DONEPEZIL HYDROCHLORIDE 10 MG/1
10 TABLET, FILM COATED ORAL NIGHTLY
Qty: 90 TABLET | Refills: 3 | Status: SHIPPED | OUTPATIENT
Start: 2020-03-11 | End: 2021-03-10 | Stop reason: SDUPTHER

## 2020-03-11 RX ORDER — SIMVASTATIN 40 MG/1
40 TABLET, FILM COATED ORAL NIGHTLY
Qty: 90 TABLET | Refills: 3 | Status: SHIPPED | OUTPATIENT
Start: 2020-03-11 | End: 2021-03-10 | Stop reason: SDUPTHER

## 2020-03-11 RX ORDER — MEMANTINE HYDROCHLORIDE 10 MG/1
10 TABLET ORAL 2 TIMES DAILY
Qty: 180 TABLET | Refills: 3 | Status: SHIPPED | OUTPATIENT
Start: 2020-03-11 | End: 2021-03-10 | Stop reason: SDUPTHER

## 2020-03-11 NOTE — PROGRESS NOTES
SUBJECTIVE:    Patient ID: Kojo Villa is a 85 y.o. female.    Chief Complaint: Establish Care and requests to restart triamcinolone/b12    Patient with past medical history significant for hyperlipidemia, basal cell carcinoma and Alzheimer's is brought in by her family member for evaluation.  She has no major complaints.  She has been currently stable on Namenda and Aricept for her dementia symptoms.  She lives with family and has a sitter.  She has no issues with falls.  She is functionally incontinent.  She has some issues occasionally with dry skin and she picks.  They have been using triamcinolone cream to help with this.  She is up-to-date with flu shot.  They report no major complaints.  Her son who accompanies her today reports that she had recent labs done less than 6 months ago.  He reports she has no issues with sleep.  The patient does recognize who her son is.  Noted to act some questions and make some statements repeatedly.      Past Medical History:   Diagnosis Date    Alzheimer's disease     Back pain     Colon polyps     Hyperlipemia     Skin cancer     skin     Past Surgical History:   Procedure Laterality Date    EPIDURAL STEROID INJECTION INTO LUMBAR SPINE N/A 2/7/2019    Procedure: Injection-steroid-epidural-lumbar;  Surgeon: Jose Escalante MD;  Location: [facility];  Service: Pain Management;  Laterality: N/A;  L5-S1    SKIN CANCER EXCISION       Family History   Problem Relation Age of Onset    Cancer Mother     Cancer Sister     Cancer Brother        Marital Status:   Alcohol History:  reports that she drinks about 1.0 standard drinks of alcohol per week.  Tobacco History:  reports that she has never smoked. She has never used smokeless tobacco.  Drug History:  reports that she does not use drugs.    Review of patient's allergies indicates:   Allergen Reactions    Sulfa (sulfonamide antibiotics) Other (See Comments)     Mother told her when she was a child she was  "allergic       Current Outpatient Medications:     donepeziL (ARICEPT) 10 MG tablet, Take 1 tablet (10 mg total) by mouth every evening., Disp: 90 tablet, Rfl: 3    memantine (NAMENDA) 10 MG Tab, Take 1 tablet (10 mg total) by mouth 2 (two) times daily., Disp: 180 tablet, Rfl: 3    simvastatin (ZOCOR) 40 MG tablet, Take 1 tablet (40 mg total) by mouth every evening., Disp: 90 tablet, Rfl: 3    triamcinolone acetonide 0.1% (KENALOG) 0.1 % cream, Apply topically 2 (two) times daily as needed., Disp: 45 g, Rfl: 5    VITAMIN B-12 1000 MCG tablet, TK 1 T PO D, Disp: , Rfl: 0  No current facility-administered medications for this visit.     Facility-Administered Medications Ordered in Other Visits:     lactated ringers infusion, , Intravenous, Once PRN, Jose Escalante MD    Review of Systems   Constitutional: Negative for activity change, fatigue and unexpected weight change.   HENT: Negative for hearing loss, postnasal drip, sinus pressure, sore throat and voice change.    Eyes: Negative for photophobia and visual disturbance.   Respiratory: Negative for cough, shortness of breath and wheezing.    Cardiovascular: Negative for chest pain and palpitations.   Gastrointestinal: Negative for constipation, diarrhea and nausea.   Genitourinary: Negative for difficulty urinating, frequency, hematuria and urgency.   Musculoskeletal: Negative for arthralgias and back pain.   Skin: Positive for rash.   Neurological: Negative for weakness, light-headedness and headaches.   Hematological: Negative for adenopathy. Does not bruise/bleed easily.   Psychiatric/Behavioral: The patient is not nervous/anxious.           Objective:      Vitals:    03/11/20 1518   BP: 118/64   Pulse: 72   Weight: 69.4 kg (153 lb)   Height: 5' 4" (1.626 m)     Physical Exam   Constitutional: She is oriented to person, place, and time. Vital signs are normal. She appears well-developed and well-nourished. No distress.   Pleasant elderly female.  Cooperative " with exam.   HENT:   Head: Normocephalic and atraumatic.   Right Ear: Tympanic membrane and external ear normal.   Left Ear: Tympanic membrane and external ear normal.   Eyes: Pupils are equal, round, and reactive to light. Conjunctivae, EOM and lids are normal.   Neck: Full passive range of motion without pain. Neck supple. No JVD present. No tracheal deviation present. No thyromegaly present.   Cardiovascular: Normal rate and regular rhythm. PMI is not displaced.   Pulmonary/Chest: Effort normal and breath sounds normal.   Abdominal: Soft. Bowel sounds are normal. There is no hepatosplenomegaly. There is no tenderness. There is no rebound and no guarding.   Musculoskeletal: Normal range of motion. She exhibits no edema or tenderness.   Neurological: She is alert and oriented to person, place, and time.   Skin: Skin is warm and dry.   Scaling, excoriations and actinic keratotic lesions to bilateral arms and hands   Psychiatric: She has a normal mood and affect. Cognition and memory are impaired.   Vitals reviewed.        Assessment:       1. Alzheimer's dementia without behavioral disturbance, unspecified timing of dementia onset    2. Mixed hyperlipidemia    3. Dermatitis    4. Basal cell carcinoma of forehead         Plan:       Alzheimer's dementia without behavioral disturbance, unspecified timing of dementia onset  -     donepeziL (ARICEPT) 10 MG tablet; Take 1 tablet (10 mg total) by mouth every evening.  Dispense: 90 tablet; Refill: 3  -     memantine (NAMENDA) 10 MG Tab; Take 1 tablet (10 mg total) by mouth 2 (two) times daily.  Dispense: 180 tablet; Refill: 3    Mixed hyperlipidemia  -     simvastatin (ZOCOR) 40 MG tablet; Take 1 tablet (40 mg total) by mouth every evening.  Dispense: 90 tablet; Refill: 3    Dermatitis  -     triamcinolone acetonide 0.1% (KENALOG) 0.1 % cream; Apply topically 2 (two) times daily as needed.  Dispense: 45 g; Refill: 5    Basal cell carcinoma of forehead     Patient is stable  on her current medications.  Labs will be performed on next visit.  Follow up in about 6 months (around 9/11/2020) for ALZ.

## 2020-09-10 ENCOUNTER — OFFICE VISIT (OUTPATIENT)
Dept: FAMILY MEDICINE | Facility: CLINIC | Age: 85
End: 2020-09-10
Payer: MEDICARE

## 2020-09-10 VITALS
SYSTOLIC BLOOD PRESSURE: 100 MMHG | HEART RATE: 68 BPM | BODY MASS INDEX: 24.75 KG/M2 | TEMPERATURE: 98 F | DIASTOLIC BLOOD PRESSURE: 60 MMHG | HEIGHT: 64 IN | WEIGHT: 145 LBS

## 2020-09-10 DIAGNOSIS — Z23 NEEDS FLU SHOT: ICD-10-CM

## 2020-09-10 DIAGNOSIS — E78.2 MIXED HYPERLIPIDEMIA: Primary | ICD-10-CM

## 2020-09-10 DIAGNOSIS — Z79.899 LONG-TERM USE OF HIGH-RISK MEDICATION: ICD-10-CM

## 2020-09-10 PROCEDURE — 1159F PR MEDICATION LIST DOCUMENTED IN MEDICAL RECORD: ICD-10-PCS | Mod: S$GLB,,, | Performed by: FAMILY MEDICINE

## 2020-09-10 PROCEDURE — 99213 OFFICE O/P EST LOW 20 MIN: CPT | Mod: 25,S$GLB,, | Performed by: FAMILY MEDICINE

## 2020-09-10 PROCEDURE — 1101F PR PT FALLS ASSESS DOC 0-1 FALLS W/OUT INJ PAST YR: ICD-10-PCS | Mod: S$GLB,,, | Performed by: FAMILY MEDICINE

## 2020-09-10 PROCEDURE — G0008 ADMIN INFLUENZA VIRUS VAC: HCPCS | Mod: S$GLB,,, | Performed by: FAMILY MEDICINE

## 2020-09-10 PROCEDURE — G0008 FLU VACCINE - QUADRIVALENT - HIGH DOSE (65+) PRESERVATIVE FREE IM: ICD-10-PCS | Mod: S$GLB,,, | Performed by: FAMILY MEDICINE

## 2020-09-10 PROCEDURE — 99213 PR OFFICE/OUTPT VISIT, EST, LEVL III, 20-29 MIN: ICD-10-PCS | Mod: 25,S$GLB,, | Performed by: FAMILY MEDICINE

## 2020-09-10 PROCEDURE — 1159F MED LIST DOCD IN RCRD: CPT | Mod: S$GLB,,, | Performed by: FAMILY MEDICINE

## 2020-09-10 PROCEDURE — 1101F PT FALLS ASSESS-DOCD LE1/YR: CPT | Mod: S$GLB,,, | Performed by: FAMILY MEDICINE

## 2020-09-10 PROCEDURE — 90662 FLU VACCINE - QUADRIVALENT - HIGH DOSE (65+) PRESERVATIVE FREE IM: ICD-10-PCS | Mod: S$GLB,,, | Performed by: FAMILY MEDICINE

## 2020-09-10 PROCEDURE — 90662 IIV NO PRSV INCREASED AG IM: CPT | Mod: S$GLB,,, | Performed by: FAMILY MEDICINE

## 2020-09-10 NOTE — PROGRESS NOTES
SUBJECTIVE:    Patient ID: Kojo Villa is a 86 y.o. female.    Chief Complaint: Regular Check Up and declined flu vaccine    Pleasantly demented elderly female is brought in by her caregiver for regular visit.  She has not had any problems with her medications.  No abnormalities are reported.  Her labs are due.  They report that she is feeling well and eating well.  Has some mild trouble sleep.       Office Visit on 09/10/2020   Component Date Value Ref Range Status    Glucose 09/10/2020 115* 65 - 99 mg/dL Final    BUN, Bld 09/10/2020 15  7 - 25 mg/dL Final    Creatinine 09/10/2020 0.59* 0.60 - 0.88 mg/dL Final    eGFR if non African American 09/10/2020 84  > OR = 60 mL/min/1.73m2 Final    eGFR if African American 09/10/2020 97  > OR = 60 mL/min/1.73m2 Final    BUN/Creatinine Ratio 09/10/2020 25* 6 - 22 (calc) Final    Sodium 09/10/2020 142  135 - 146 mmol/L Final    Potassium 09/10/2020 3.6  3.5 - 5.3 mmol/L Final    Chloride 09/10/2020 107  98 - 110 mmol/L Final    CO2 09/10/2020 27  20 - 32 mmol/L Final    Calcium 09/10/2020 9.0  8.6 - 10.4 mg/dL Final    Total Protein 09/10/2020 6.3  6.1 - 8.1 g/dL Final    Albumin 09/10/2020 3.8  3.6 - 5.1 g/dL Final    Globulin, Total 09/10/2020 2.5  1.9 - 3.7 g/dL (calc) Final    Albumin/Globulin Ratio 09/10/2020 1.5  1.0 - 2.5 (calc) Final    Total Bilirubin 09/10/2020 0.6  0.2 - 1.2 mg/dL Final    Alkaline Phosphatase 09/10/2020 59  37 - 153 U/L Final    AST 09/10/2020 14  10 - 35 U/L Final    ALT 09/10/2020 10  6 - 29 U/L Final    Cholesterol 09/10/2020 99  <200 mg/dL Final    HDL 09/10/2020 37* > OR = 50 mg/dL Final    Triglycerides 09/10/2020 103  <150 mg/dL Final    LDL Cholesterol 09/10/2020 43  mg/dL (calc) Final    Hdl/Cholesterol Ratio 09/10/2020 2.7  <5.0 (calc) Final    Non HDL Chol. (LDL+VLDL) 09/10/2020 62  <130 mg/dL (calc) Final    WBC 09/10/2020 4.8  3.8 - 10.8 Thousand/uL Final    RBC 09/10/2020 4.17  3.80 - 5.10 Million/uL  Final    Hemoglobin 09/10/2020 12.4  11.7 - 15.5 g/dL Final    Hematocrit 09/10/2020 36.9  35.0 - 45.0 % Final    Mean Corpuscular Volume 09/10/2020 88.5  80.0 - 100.0 fL Final    Mean Corpuscular Hemoglobin 09/10/2020 29.7  27.0 - 33.0 pg Final    Mean Corpuscular Hemoglobin Conc 09/10/2020 33.6  32.0 - 36.0 g/dL Final    RDW 09/10/2020 12.3  11.0 - 15.0 % Final    Platelets 09/10/2020 200  140 - 400 Thousand/uL Final    MPV 09/10/2020 10.8  7.5 - 12.5 fL Final    Neutrophils Absolute 09/10/2020 3,302  1,500 - 7,800 cells/uL Final    Lymph # 09/10/2020 1,080  850 - 3,900 cells/uL Final    Mono # 09/10/2020 298  200 - 950 cells/uL Final    Eos # 09/10/2020 91  15 - 500 cells/uL Final    Baso # 09/10/2020 29  0 - 200 cells/uL Final    Neutrophils Relative 09/10/2020 68.8  % Final    Lymph% 09/10/2020 22.5  % Final    Mono% 09/10/2020 6.2  % Final    Eosinophil% 09/10/2020 1.9  % Final    Basophil% 09/10/2020 0.6  % Final       Past Medical History:   Diagnosis Date    Alzheimer's disease     Back pain     Colon polyps     Hyperlipemia     Skin cancer     skin     Past Surgical History:   Procedure Laterality Date    EPIDURAL STEROID INJECTION INTO LUMBAR SPINE N/A 2/7/2019    Procedure: Injection-steroid-epidural-lumbar;  Surgeon: Jose Escalante MD;  Location: [facility];  Service: Pain Management;  Laterality: N/A;  L5-S1    SKIN CANCER EXCISION       Family History   Problem Relation Age of Onset    Cancer Mother     Cancer Sister     Cancer Brother        Marital Status:   Alcohol History:  reports current alcohol use of about 1.0 standard drinks of alcohol per week.  Tobacco History:  reports that she has never smoked. She has never used smokeless tobacco.  Drug History:  reports no history of drug use.    Review of patient's allergies indicates:   Allergen Reactions    Sulfa (sulfonamide antibiotics) Other (See Comments)     Mother told her when she was a child she was allergic  "      Current Outpatient Medications:     donepeziL (ARICEPT) 10 MG tablet, Take 1 tablet (10 mg total) by mouth every evening., Disp: 90 tablet, Rfl: 3    memantine (NAMENDA) 10 MG Tab, Take 1 tablet (10 mg total) by mouth 2 (two) times daily., Disp: 180 tablet, Rfl: 3    simvastatin (ZOCOR) 40 MG tablet, Take 1 tablet (40 mg total) by mouth every evening., Disp: 90 tablet, Rfl: 3    triamcinolone acetonide 0.1% (KENALOG) 0.1 % cream, Apply topically 2 (two) times daily as needed., Disp: 45 g, Rfl: 5    VITAMIN B-12 1000 MCG tablet, TK 1 T PO D, Disp: , Rfl: 0  No current facility-administered medications for this visit.     Facility-Administered Medications Ordered in Other Visits:     lactated ringers infusion, , Intravenous, Once PRN, Jose Escalante MD    Review of Systems   Constitutional: Negative for activity change, fatigue and unexpected weight change.   HENT: Negative for hearing loss, postnasal drip, sinus pressure, sore throat and voice change.    Eyes: Negative for photophobia and visual disturbance.   Respiratory: Negative for cough, shortness of breath and wheezing.    Cardiovascular: Negative for chest pain and palpitations.   Gastrointestinal: Negative for constipation, diarrhea and nausea.   Genitourinary: Negative for difficulty urinating, frequency, hematuria and urgency.   Musculoskeletal: Negative for arthralgias and back pain.   Skin: Negative for rash.   Neurological: Negative for weakness, light-headedness and headaches.   Hematological: Negative for adenopathy. Does not bruise/bleed easily.   Psychiatric/Behavioral: The patient is not nervous/anxious.           Objective:      Vitals:    09/10/20 1031   BP: 100/60   Pulse: 68   Temp: 98.2 °F (36.8 °C)   Weight: 65.8 kg (145 lb)   Height: 5' 4" (1.626 m)     Physical Exam  Vitals signs reviewed.   Constitutional:       General: She is not in acute distress.     Appearance: Normal appearance. She is well-developed.   HENT:      Head: " Normocephalic and atraumatic.      Right Ear: External ear normal.      Left Ear: External ear normal.      Nose: Nose normal.      Mouth/Throat:      Mouth: Mucous membranes are moist.   Eyes:      General: Lids are normal.      Conjunctiva/sclera: Conjunctivae normal.      Pupils: Pupils are equal, round, and reactive to light.   Neck:      Musculoskeletal: Full passive range of motion without pain and neck supple.      Thyroid: No thyromegaly.      Vascular: No JVD.      Trachea: No tracheal deviation.   Cardiovascular:      Rate and Rhythm: Normal rate and regular rhythm.      Chest Wall: PMI is not displaced.      Pulses: Normal pulses.      Heart sounds: Normal heart sounds.   Pulmonary:      Effort: Pulmonary effort is normal.      Breath sounds: Normal breath sounds.   Abdominal:      General: Bowel sounds are normal.      Palpations: Abdomen is soft.      Tenderness: There is no abdominal tenderness. There is no guarding or rebound.   Musculoskeletal: Normal range of motion.         General: No tenderness.   Skin:     General: Skin is warm and dry.      Findings: No rash.   Neurological:      General: No focal deficit present.      Mental Status: She is alert.      Cranial Nerves: Cranial nerves are intact.      Coordination: Coordination is intact.   Psychiatric:         Mood and Affect: Mood normal. Affect is flat.         Behavior: Behavior normal.         Cognition and Memory: Cognition is impaired. Memory is impaired.           Assessment:       1. Mixed hyperlipidemia    2. Long-term use of high-risk medication    3. Needs flu shot         Plan:       Mixed hyperlipidemia  -     Comprehensive metabolic panel; Future; Expected date: 09/10/2020  -     Lipid Panel; Future; Expected date: 09/10/2020    Long-term use of high-risk medication  -     CBC auto differential; Future; Expected date: 09/10/2020    Needs flu shot  -     Influenza - Quadrivalent - High Dose (65+) (PF) (IM)      Follow up in about 6  months (around 3/10/2021) for dementia .

## 2020-09-11 LAB
ALBUMIN SERPL-MCNC: 3.8 G/DL (ref 3.6–5.1)
ALBUMIN/GLOB SERPL: 1.5 (CALC) (ref 1–2.5)
ALP SERPL-CCNC: 59 U/L (ref 37–153)
ALT SERPL-CCNC: 10 U/L (ref 6–29)
AST SERPL-CCNC: 14 U/L (ref 10–35)
BASOPHILS # BLD AUTO: 29 CELLS/UL (ref 0–200)
BASOPHILS NFR BLD AUTO: 0.6 %
BILIRUB SERPL-MCNC: 0.6 MG/DL (ref 0.2–1.2)
BUN SERPL-MCNC: 15 MG/DL (ref 7–25)
BUN/CREAT SERPL: 25 (CALC) (ref 6–22)
CALCIUM SERPL-MCNC: 9 MG/DL (ref 8.6–10.4)
CHLORIDE SERPL-SCNC: 107 MMOL/L (ref 98–110)
CHOLEST SERPL-MCNC: 99 MG/DL
CHOLEST/HDLC SERPL: 2.7 (CALC)
CO2 SERPL-SCNC: 27 MMOL/L (ref 20–32)
CREAT SERPL-MCNC: 0.59 MG/DL (ref 0.6–0.88)
EOSINOPHIL # BLD AUTO: 91 CELLS/UL (ref 15–500)
EOSINOPHIL NFR BLD AUTO: 1.9 %
ERYTHROCYTE [DISTWIDTH] IN BLOOD BY AUTOMATED COUNT: 12.3 % (ref 11–15)
GFRSERPLBLD MDRD-ARVRAT: 84 ML/MIN/1.73M2
GLOBULIN SER CALC-MCNC: 2.5 G/DL (CALC) (ref 1.9–3.7)
GLUCOSE SERPL-MCNC: 115 MG/DL (ref 65–99)
HCT VFR BLD AUTO: 36.9 % (ref 35–45)
HDLC SERPL-MCNC: 37 MG/DL
HGB BLD-MCNC: 12.4 G/DL (ref 11.7–15.5)
LDLC SERPL CALC-MCNC: 43 MG/DL (CALC)
LYMPHOCYTES # BLD AUTO: 1080 CELLS/UL (ref 850–3900)
LYMPHOCYTES NFR BLD AUTO: 22.5 %
MCH RBC QN AUTO: 29.7 PG (ref 27–33)
MCHC RBC AUTO-ENTMCNC: 33.6 G/DL (ref 32–36)
MCV RBC AUTO: 88.5 FL (ref 80–100)
MONOCYTES # BLD AUTO: 298 CELLS/UL (ref 200–950)
MONOCYTES NFR BLD AUTO: 6.2 %
NEUTROPHILS # BLD AUTO: 3302 CELLS/UL (ref 1500–7800)
NEUTROPHILS NFR BLD AUTO: 68.8 %
NONHDLC SERPL-MCNC: 62 MG/DL (CALC)
PLATELET # BLD AUTO: 200 THOUSAND/UL (ref 140–400)
PMV BLD REES-ECKER: 10.8 FL (ref 7.5–12.5)
POTASSIUM SERPL-SCNC: 3.6 MMOL/L (ref 3.5–5.3)
PROT SERPL-MCNC: 6.3 G/DL (ref 6.1–8.1)
RBC # BLD AUTO: 4.17 MILLION/UL (ref 3.8–5.1)
SODIUM SERPL-SCNC: 142 MMOL/L (ref 135–146)
TRIGL SERPL-MCNC: 103 MG/DL
WBC # BLD AUTO: 4.8 THOUSAND/UL (ref 3.8–10.8)

## 2020-12-15 ENCOUNTER — TELEPHONE (OUTPATIENT)
Dept: FAMILY MEDICINE | Facility: CLINIC | Age: 85
End: 2020-12-15

## 2020-12-15 DIAGNOSIS — S70.00XA CONTUSION OF HIP REGION: ICD-10-CM
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
## 2020-12-15 DIAGNOSIS — M25.559 HIP PAIN: ICD-10-CM

## 2020-12-15 NOTE — TELEPHONE ENCOUNTER
Pt scheduled for tomorrow for FU for a fall. Pt fell on her buttocks twice and is now in pain and has trouble getting up from a sitting position. Does she need XR before the appt?

## 2020-12-15 NOTE — TELEPHONE ENCOUNTER
----- Message from Monika Obrien sent at 12/15/2020  2:10 PM CST -----  Pt's daughter calling the pt has a wound on her buttock and she fell in the shower yesterday.  She stated the pt is no c/o pain due to the fall but does say the wound is tender.  # [phone]

## 2020-12-16 ENCOUNTER — OFFICE VISIT (OUTPATIENT)
Dept: FAMILY MEDICINE | Facility: CLINIC | Age: 85
End: 2020-12-16
Payer: MEDICARE

## 2020-12-16 ENCOUNTER — HOSPITAL ENCOUNTER (OUTPATIENT)
Dept: RADIOLOGY | Facility: HOSPITAL | Age: 85
Discharge: HOME | End: 2020-12-16
Attending: FAMILY MEDICINE
Payer: MEDICARE

## 2020-12-16 VITALS
WEIGHT: 141 LBS | BODY MASS INDEX: 24.07 KG/M2 | DIASTOLIC BLOOD PRESSURE: 60 MMHG | SYSTOLIC BLOOD PRESSURE: 126 MMHG | HEART RATE: 72 BPM | HEIGHT: 64 IN
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
## 2020-12-16 DIAGNOSIS — F02.80 ALZHEIMER'S DEMENTIA WITHOUT BEHAVIORAL DISTURBANCE, UNSPECIFIED TIMING OF DEMENTIA ONSET: ICD-10-CM

## 2020-12-16 DIAGNOSIS — G30.9 ALZHEIMER'S DEMENTIA WITHOUT BEHAVIORAL DISTURBANCE, UNSPECIFIED TIMING OF DEMENTIA ONSET: ICD-10-CM

## 2020-12-16 DIAGNOSIS — S70.12XA CONTUSION OF LEFT THIGH, INITIAL ENCOUNTER: ICD-10-CM
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
## 2020-12-16 DIAGNOSIS — S70.01XA CONTUSION OF RIGHT HIP, INITIAL ENCOUNTER: ICD-10-CM

## 2020-12-16 PROCEDURE — 1101F PT FALLS ASSESS-DOCD LE1/YR: CPT | Mod: S$GLB,,, | Performed by: FAMILY MEDICINE

## 2020-12-16 PROCEDURE — 99213 PR OFFICE/OUTPT VISIT, EST, LEVL III, 20-29 MIN: ICD-10-PCS | Mod: S$GLB,,, | Performed by: FAMILY MEDICINE

## 2020-12-16 PROCEDURE — 3288F PR FALLS RISK ASSESSMENT DOCUMENTED: ICD-10-PCS | Mod: S$GLB,,, | Performed by: FAMILY MEDICINE

## 2020-12-16 PROCEDURE — 1159F PR MEDICATION LIST DOCUMENTED IN MEDICAL RECORD: ICD-10-PCS | Mod: S$GLB,,, | Performed by: FAMILY MEDICINE

## 2020-12-16 PROCEDURE — 72220 X-RAY EXAM SACRUM TAILBONE: CPT | Mod: TC,PO

## 2020-12-16 PROCEDURE — 1159F MED LIST DOCD IN RCRD: CPT | Mod: S$GLB,,, | Performed by: FAMILY MEDICINE

## 2020-12-16 PROCEDURE — 1101F PR PT FALLS ASSESS DOC 0-1 FALLS W/OUT INJ PAST YR: ICD-10-PCS | Mod: S$GLB,,, | Performed by: FAMILY MEDICINE

## 2020-12-16 PROCEDURE — 99213 OFFICE O/P EST LOW 20 MIN: CPT | Mod: S$GLB,,, | Performed by: FAMILY MEDICINE

## 2020-12-16 PROCEDURE — 3288F FALL RISK ASSESSMENT DOCD: CPT | Mod: S$GLB,,, | Performed by: FAMILY MEDICINE

## 2020-12-16 RX ORDER — NAPROXEN 500 MG/1
500 TABLET ORAL 2 TIMES DAILY WITH MEALS
Qty: 30 TABLET | Refills: 0 | Status: SHIPPED | OUTPATIENT
Start: 2020-12-16 | End: 2020-12-31

## 2020-12-16 NOTE — PROGRESS NOTES
SUBJECTIVE:    Patient ID: Kojo Villa is a 86 y.o. female.    Chief Complaint: Xray Followup and check sore on bottom    HPI     Office Visit on 09/10/2020   Component Date Value Ref Range Status    Glucose 09/10/2020 115* 65 - 99 mg/dL Final    BUN 09/10/2020 15  7 - 25 mg/dL Final    Creatinine 09/10/2020 0.59* 0.6 - 0.8 mg/dL Final    eGFR if non African American 09/10/2020 84  > OR = 60 mL/min/1.73m2 Final    eGFR if African American 09/10/2020 97  > OR = 60 mL/min/1.73m2 Final    BUN/Creatinine Ratio 09/10/2020 25* 6 - 22 (calc) Final    Sodium 09/10/2020 142  135 - 146 mmol/L Final    Potassium 09/10/2020 3.6  3.5 - 5.3 mmol/L Final    Chloride 09/10/2020 107  98 - 110 mmol/L Final    CO2 09/10/2020 27  20 - 32 mmol/L Final    Calcium 09/10/2020 9.0  8.6 - 10.4 mg/dL Final    Total Protein 09/10/2020 6.3  6.1 - 8.1 g/dL Final    Albumin 09/10/2020 3.8  3.6 - 5.1 g/dL Final    Globulin, Total 09/10/2020 2.5  1.9 - 3.7 g/dL (calc) Final    Albumin/Globulin Ratio 09/10/2020 1.5  1.0 - 2.5 (calc) Final    Total Bilirubin 09/10/2020 0.6  0.2 - 1.2 mg/dL Final    Alkaline Phosphatase 09/10/2020 59  37 - 153 U/L Final    AST 09/10/2020 14  10 - 35 U/L Final    ALT 09/10/2020 10  6 - 29 U/L Final    Cholesterol 09/10/2020 99  <200 mg/dL Final    HDL 09/10/2020 37* > OR = 50 mg/dL Final    Triglycerides 09/10/2020 103  <150 mg/dL Final    LDL Cholesterol 09/10/2020 43  mg/dL (calc) Final    HDL/Cholesterol Ratio 09/10/2020 2.7  <5.0 (calc) Final    Non HDL Chol. (LDL+VLDL) 09/10/2020 62  <130 mg/dL (calc) Final    WBC 09/10/2020 4.8  3.8 - 10.8 Thousand/uL Final    RBC 09/10/2020 4.17  3.80 - 5.10 Million/uL Final    Hemoglobin 09/10/2020 12.4  11.7 - 15.5 g/dL Final    Hematocrit 09/10/2020 36.9  35 - 45 % Final    MCV 09/10/2020 88.5  80.0 - 100.0 fL Final    MCH 09/10/2020 29.7  27.0 - 33.0 pg Final    MCHC 09/10/2020 33.6  32 - 36 g/dL Final    RDW 09/10/2020 12.3  11.0 -  15.0 % Final    Platelets 09/10/2020 200  140 - 400 Thousand/uL Final    MPV 09/10/2020 10.8  7.5 - 12.5 fL Final    Neutrophils Absolute 09/10/2020 3,302  1,500 - 7,800 cells/uL Final    Lymph # 09/10/2020 1,080  850 - 3,900 cells/uL Final    Mono # 09/10/2020 298  200 - 950 cells/uL Final    Eos # 09/10/2020 91  15 - 500 cells/uL Final    Baso # 09/10/2020 29  0 - 200 cells/uL Final    Neutrophils Relative 09/10/2020 68.8  % Final    Lymph % 09/10/2020 22.5  % Final    Mono % 09/10/2020 6.2  % Final    Eosinophil % 09/10/2020 1.9  % Final    Basophil % 09/10/2020 0.6  % Final       Past Medical History:   Diagnosis Date    Alzheimer's disease     Back pain     Colon polyps     Hyperlipemia     Skin cancer     skin     Past Surgical History:   Procedure Laterality Date    EPIDURAL STEROID INJECTION INTO LUMBAR SPINE N/A 2/7/2019    Procedure: Injection-steroid-epidural-lumbar;  Surgeon: Jose Escalante MD;  Location: [facility];  Service: Pain Management;  Laterality: N/A;  L5-S1    SKIN CANCER EXCISION       Family History   Problem Relation Age of Onset    Cancer Mother     Cancer Sister     Cancer Brother        Marital Status:   Alcohol History:  reports current alcohol use of about 1.0 standard drinks of alcohol per week.  Tobacco History:  reports that she has never smoked. She has never used smokeless tobacco.  Drug History:  reports no history of drug use.    Review of patient's allergies indicates:   Allergen Reactions    Sulfa (sulfonamide antibiotics) Other (See Comments)     Mother told her when she was a child she was allergic       Current Outpatient Medications:     donepeziL (ARICEPT) 10 MG tablet, Take 1 tablet (10 mg total) by mouth every evening., Disp: 90 tablet, Rfl: 3    memantine (NAMENDA) 10 MG Tab, Take 1 tablet (10 mg total) by mouth 2 (two) times daily., Disp: 180 tablet, Rfl: 3    simvastatin (ZOCOR) 40 MG tablet, Take 1 tablet (40 mg total) by mouth every  "evening., Disp: 90 tablet, Rfl: 3    triamcinolone acetonide 0.1% (KENALOG) 0.1 % cream, Apply topically 2 (two) times daily as needed., Disp: 45 g, Rfl: 5    VITAMIN B-12 1000 MCG tablet, TK 1 T PO D, Disp: , Rfl: 0  No current facility-administered medications for this visit.    Facility-Administered Medications Ordered in Other Visits:     lactated ringers infusion, , Intravenous, Once PRN, Jose Escalante MD    Review of Systems       Objective:      Vitals:    12/16/20 1611   BP: 126/60   Pulse: 72   Weight: 64 kg (141 lb)   Height: 5' 4" (1.626 m)     Physical Exam      Assessment:       1. Fall, initial encounter    2. Contusion of right hip, initial encounter    3. Contusion of left thigh, initial encounter    4. Alzheimer's dementia without behavioral disturbance, unspecified timing of dementia onset         Plan:       Fall, initial encounter    Contusion of right hip, initial encounter    Contusion of left thigh, initial encounter  -     naproxen (EC NAPROSYN) 500 MG EC tablet; Take 1 tablet (500 mg total) by mouth 2 (two) times daily with meals. for 15 days  Dispense: 30 tablet; Refill: 0    Alzheimer's dementia without behavioral disturbance, unspecified timing of dementia onset      Follow up if symptoms worsen or fail to improve.              "

## 2021-01-01 ENCOUNTER — IMMUNIZATION (OUTPATIENT)
Dept: PRIMARY CARE CLINIC | Facility: CLINIC | Age: 86
End: 2021-01-01
Payer: MEDICARE

## 2021-01-01 ENCOUNTER — PATIENT MESSAGE (OUTPATIENT)
Dept: FAMILY MEDICINE | Facility: CLINIC | Age: 86
End: 2021-01-01
Payer: MEDICARE

## 2021-01-01 ENCOUNTER — HOSPITAL ENCOUNTER (OUTPATIENT)
Dept: RADIOLOGY | Facility: HOSPITAL | Age: 86
Discharge: HOME | End: 2021-12-07
Attending: NEUROLOGICAL SURGERY
Payer: MEDICARE

## 2021-01-01 DIAGNOSIS — Z23 NEED FOR VACCINATION: Primary | ICD-10-CM

## 2021-01-01 DIAGNOSIS — F02.80 ALZHEIMER'S DISEASE: ICD-10-CM

## 2021-01-01 DIAGNOSIS — G30.9 ALZHEIMER'S DISEASE: ICD-10-CM

## 2021-01-01 PROCEDURE — 0004A COVID-19, MRNA, LNP-S, PF, 30 MCG/0.3 ML DOSE VACCINE: ICD-10-PCS | Mod: S$GLB,,, | Performed by: FAMILY MEDICINE

## 2021-01-01 PROCEDURE — 91300 COVID-19, MRNA, LNP-S, PF, 30 MCG/0.3 ML DOSE VACCINE: CPT | Mod: S$GLB,,, | Performed by: FAMILY MEDICINE

## 2021-01-01 PROCEDURE — 0004A COVID-19, MRNA, LNP-S, PF, 30 MCG/0.3 ML DOSE VACCINE: CPT | Mod: S$GLB,,, | Performed by: FAMILY MEDICINE

## 2021-01-01 PROCEDURE — 70551 MRI BRAIN STEM W/O DYE: CPT | Mod: TC,PO

## 2021-01-01 PROCEDURE — 91300 COVID-19, MRNA, LNP-S, PF, 30 MCG/0.3 ML DOSE VACCINE: ICD-10-PCS | Mod: S$GLB,,, | Performed by: FAMILY MEDICINE

## 2021-01-19 ENCOUNTER — IMMUNIZATION (OUTPATIENT)
Dept: PRIMARY CARE CLINIC | Facility: CLINIC | Age: 86
End: 2021-01-19
Payer: MEDICARE

## 2021-01-19 DIAGNOSIS — Z23 NEED FOR VACCINATION: Primary | ICD-10-CM

## 2021-01-19 PROCEDURE — 0001A COVID-19, MRNA, LNP-S, PF, 30 MCG/0.3 ML DOSE VACCINE: CPT | Mod: S$GLB,,, | Performed by: FAMILY MEDICINE

## 2021-01-19 PROCEDURE — 91300 COVID-19, MRNA, LNP-S, PF, 30 MCG/0.3 ML DOSE VACCINE: ICD-10-PCS | Mod: S$GLB,,, | Performed by: FAMILY MEDICINE

## 2021-01-19 PROCEDURE — 91300 COVID-19, MRNA, LNP-S, PF, 30 MCG/0.3 ML DOSE VACCINE: CPT | Mod: S$GLB,,, | Performed by: FAMILY MEDICINE

## 2021-01-19 PROCEDURE — 0001A COVID-19, MRNA, LNP-S, PF, 30 MCG/0.3 ML DOSE VACCINE: ICD-10-PCS | Mod: S$GLB,,, | Performed by: FAMILY MEDICINE

## 2021-02-09 ENCOUNTER — IMMUNIZATION (OUTPATIENT)
Dept: PRIMARY CARE CLINIC | Facility: CLINIC | Age: 86
End: 2021-02-09
Payer: MEDICARE

## 2021-02-09 DIAGNOSIS — Z23 NEED FOR VACCINATION: Primary | ICD-10-CM

## 2021-02-09 PROCEDURE — 0002A COVID-19, MRNA, LNP-S, PF, 30 MCG/0.3 ML DOSE VACCINE: ICD-10-PCS | Mod: CV19,S$GLB,, | Performed by: FAMILY MEDICINE

## 2021-02-09 PROCEDURE — 91300 COVID-19, MRNA, LNP-S, PF, 30 MCG/0.3 ML DOSE VACCINE: ICD-10-PCS | Mod: S$GLB,,, | Performed by: FAMILY MEDICINE

## 2021-02-09 PROCEDURE — 0002A COVID-19, MRNA, LNP-S, PF, 30 MCG/0.3 ML DOSE VACCINE: CPT | Mod: CV19,S$GLB,, | Performed by: FAMILY MEDICINE

## 2021-02-09 PROCEDURE — 91300 COVID-19, MRNA, LNP-S, PF, 30 MCG/0.3 ML DOSE VACCINE: CPT | Mod: S$GLB,,, | Performed by: FAMILY MEDICINE

## 2021-03-10 ENCOUNTER — OFFICE VISIT (OUTPATIENT)
Dept: FAMILY MEDICINE | Facility: CLINIC | Age: 86
End: 2021-03-10
Payer: MEDICARE

## 2021-03-10 VITALS
DIASTOLIC BLOOD PRESSURE: 54 MMHG | BODY MASS INDEX: 23.05 KG/M2 | WEIGHT: 135 LBS | HEART RATE: 54 BPM | SYSTOLIC BLOOD PRESSURE: 102 MMHG | HEIGHT: 64 IN

## 2021-03-10 DIAGNOSIS — G30.9 ALZHEIMER'S DEMENTIA WITHOUT BEHAVIORAL DISTURBANCE, UNSPECIFIED TIMING OF DEMENTIA ONSET: ICD-10-CM

## 2021-03-10 DIAGNOSIS — E78.2 MIXED HYPERLIPIDEMIA: ICD-10-CM

## 2021-03-10 DIAGNOSIS — L30.9 DERMATITIS: ICD-10-CM

## 2021-03-10 DIAGNOSIS — F02.80 ALZHEIMER'S DEMENTIA WITHOUT BEHAVIORAL DISTURBANCE, UNSPECIFIED TIMING OF DEMENTIA ONSET: ICD-10-CM

## 2021-03-10 PROCEDURE — 3288F FALL RISK ASSESSMENT DOCD: CPT | Mod: S$GLB,,, | Performed by: FAMILY MEDICINE

## 2021-03-10 PROCEDURE — 99214 PR OFFICE/OUTPT VISIT, EST, LEVL IV, 30-39 MIN: ICD-10-PCS | Mod: S$GLB,,, | Performed by: FAMILY MEDICINE

## 2021-03-10 PROCEDURE — 1159F MED LIST DOCD IN RCRD: CPT | Mod: S$GLB,,, | Performed by: FAMILY MEDICINE

## 2021-03-10 PROCEDURE — 3288F PR FALLS RISK ASSESSMENT DOCUMENTED: ICD-10-PCS | Mod: S$GLB,,, | Performed by: FAMILY MEDICINE

## 2021-03-10 PROCEDURE — 1101F PR PT FALLS ASSESS DOC 0-1 FALLS W/OUT INJ PAST YR: ICD-10-PCS | Mod: S$GLB,,, | Performed by: FAMILY MEDICINE

## 2021-03-10 PROCEDURE — 1101F PT FALLS ASSESS-DOCD LE1/YR: CPT | Mod: S$GLB,,, | Performed by: FAMILY MEDICINE

## 2021-03-10 PROCEDURE — 1159F PR MEDICATION LIST DOCUMENTED IN MEDICAL RECORD: ICD-10-PCS | Mod: S$GLB,,, | Performed by: FAMILY MEDICINE

## 2021-03-10 PROCEDURE — 99214 OFFICE O/P EST MOD 30 MIN: CPT | Mod: S$GLB,,, | Performed by: FAMILY MEDICINE

## 2021-03-10 RX ORDER — DONEPEZIL HYDROCHLORIDE 10 MG/1
10 TABLET, FILM COATED ORAL NIGHTLY
Qty: 90 TABLET | Refills: 3 | Status: SHIPPED | OUTPATIENT
Start: 2021-03-10 | End: 2022-01-01 | Stop reason: SDUPTHER

## 2021-03-10 RX ORDER — SIMVASTATIN 40 MG/1
40 TABLET, FILM COATED ORAL NIGHTLY
Qty: 90 TABLET | Refills: 3 | Status: SHIPPED | OUTPATIENT
Start: 2021-03-10 | End: 2022-01-01 | Stop reason: SDUPTHER

## 2021-03-10 RX ORDER — MEMANTINE HYDROCHLORIDE 10 MG/1
10 TABLET ORAL 2 TIMES DAILY
Qty: 180 TABLET | Refills: 3 | Status: SHIPPED | OUTPATIENT
Start: 2021-03-10 | End: 2022-01-01 | Stop reason: SDUPTHER

## 2021-03-10 RX ORDER — TRIAMCINOLONE ACETONIDE 1 MG/G
CREAM TOPICAL 2 TIMES DAILY PRN
Qty: 45 G | Refills: 5 | Status: SHIPPED | OUTPATIENT
Start: 2021-03-10 | End: 2022-01-01

## 2021-04-07 ENCOUNTER — TELEPHONE (OUTPATIENT)
Dept: FAMILY MEDICINE | Facility: CLINIC | Age: 86
End: 2021-04-07

## 2021-04-07 DIAGNOSIS — R54 AGE-RELATED PHYSICAL DEBILITY: ICD-10-CM

## 2021-04-07 DIAGNOSIS — Z91.81 AT RISK FOR FALLS: ICD-10-CM

## 2021-04-07 DIAGNOSIS — G30.9 ALZHEIMER'S DEMENTIA WITHOUT BEHAVIORAL DISTURBANCE, UNSPECIFIED TIMING OF DEMENTIA ONSET: Primary | ICD-10-CM

## 2021-04-07 DIAGNOSIS — F02.80 ALZHEIMER'S DEMENTIA WITHOUT BEHAVIORAL DISTURBANCE, UNSPECIFIED TIMING OF DEMENTIA ONSET: Primary | ICD-10-CM

## 2021-04-09 ENCOUNTER — TELEPHONE (OUTPATIENT)
Dept: FAMILY MEDICINE | Facility: CLINIC | Age: 86
End: 2021-04-09

## 2021-05-04 ENCOUNTER — EXTERNAL HOME HEALTH (OUTPATIENT)
Dept: HOME HEALTH SERVICES | Facility: HOSPITAL | Age: 86
End: 2021-05-04

## 2021-05-12 ENCOUNTER — DOCUMENT SCAN (OUTPATIENT)
Dept: HOME HEALTH SERVICES | Facility: HOSPITAL | Age: 86
End: 2021-05-12

## 2021-05-18 ENCOUNTER — DOCUMENT SCAN (OUTPATIENT)
Dept: HOME HEALTH SERVICES | Facility: HOSPITAL | Age: 86
End: 2021-05-18

## 2021-07-06 NOTE — PROGRESS NOTES
This note was completed with dictation software and grammatical errors may exist.    Referring Physician: Maximo Ponce MD    PCP: Amy Junior MD      CC:  Low back and left buttock pain    HPI:   Kojo Villa is a 84 y.o. female referred to us for low back and left buttock pain. She has history of dementia is accompanied by her son today.  Pain has been present for many years but has gradually worsened over the past 6 months.  She has intermittent daily tight and aching pain over her left lower back.  Pain radiates to her left buttock and left hip.  Pain worsens with sitting, walking, getting up.  Pain improves with rest.  She has tried physical therapy with minimal benefit.  She has taken ibuprofen, tramadol with minimal benefit.  She denies any worsening weakness.  No bowel bladder changes.    ROS:  CONSTITUTIONAL: No fevers, chills, night sweats, wt. loss, appetite changes  SKIN: no rashes or itching  ENT: No headaches, head trauma, vision changes, or eye pain  LYMPH NODES: None noticed   CV: No chest pain, palpitations.   RESP: No shortness of breath, dyspnea on exertion, cough, wheezing, or hemoptysis  GI: No nausea, emesis, diarrhea, constipation, melena, hematochezia, pain.    : No dysuria, hematuria, urgency, or frequency   HEME: No easy bruising, bleeding problems  PSYCHIATRIC: No depression, anxiety, psychosis, hallucinations.  NEURO: No seizures, memory loss, dizziness or difficulty sleeping  MSK:  Positive HPI      Past Medical History:   Diagnosis Date    Colon polyps     Hyperlipemia     Skin cancer      Past Surgical History:   Procedure Laterality Date    SKIN CANCER EXCISION       Family History   Problem Relation Age of Onset    Cancer Mother     Cancer Sister     Cancer Brother      Social History     Socioeconomic History    Marital status:      Spouse name: None    Number of children: None    Years of education: None    Highest education level: None  "  Social Needs    Financial resource strain: None    Food insecurity - worry: None    Food insecurity - inability: None    Transportation needs - medical: None    Transportation needs - non-medical: None   Occupational History    None   Tobacco Use    Smoking status: Never Smoker    Smokeless tobacco: Never Used   Substance and Sexual Activity    Alcohol use: Yes     Alcohol/week: 0.6 oz     Types: 1 Glasses of wine per week     Comment: with dinner occasionally    Drug use: No    Sexual activity: No   Other Topics Concern    None   Social History Narrative    None         Medications/Allergies: See med card    Vitals:    01/23/19 1409   BP: 111/69   Pulse: 67   Weight: 72.6 kg (160 lb)   Height: 5' 7" (1.702 m)   PainSc:   4   PainLoc: Back         Physical exam:    GENERAL: A and O x3, the patient appears well groomed and is in no acute distress.  Skin: No rashes or obvious lesions  HEENT: normocephalic, atraumatic  CARDIOVASCULAR:  Palpable peripheral pulses  LUNGS: easy work of breathing  ABDOMEN: soft, nontender   UPPER EXTREMITIES: Normal alignment, normal range of motion, no atrophy, no skin changes,  hair growth and nail growth normal and equal bilaterally. No swelling, no tenderness.    LOWER EXTREMITIES:  Normal alignment, normal range of motion, no atrophy, no skin changes,  hair growth and nail growth normal and equal bilaterally. No swelling, no tenderness.  LUMBAR SPINE  Lumbar spine: ROM is mildly limited with flexion extension and oblique extension with moderate increased pain.    Toni's test causes no increased pain on either side.    Supine straight leg raise is positive left of 60°   Internal and external rotation of the hip causes no increased pain on either side.  Myofascial exam: No tenderness to palpation across lumbar paraspinous muscles.      MENTAL STATUS: normal orientation, speech, language, and fund of knowledge for social situation.  Emotional state " [General Appearance - Alert] : alert appropriate.    CRANIAL NERVES:  II:  PERRL bilaterally,   III,IV,VI: EOMI.    V:  Facial sensation equal bilaterally  VII:  Facial motor function normal.  VIII:  Hearing equal to finger rub bilaterally  IX/X: Gag normal, palate symmetric  XI:  Shoulder shrug equal, head turn equal  XII:  Tongue midline without fasciculations      MOTOR: Tone and bulk: normal bilateral upper and lower Strength: normal   Delt Bi Tri WE WF     R 5 5 5 5 5 5   L 5 5 5 5 5 5     IP ADD ABD Quad TA Gas HAM  R 5 5 5 5 5 5 5  L 5 5 5 5 5 5 5    SENSATION: Light touch and pinprick intact bilaterally  REFLEXES: normal, symmetric, nonbrisk.  Toes down, no clonus. No hoffmans.  GAIT:  Uses walker for assistance at times       Imaging:  None    Assessment:  Patient presents with low back and left leg pain  1. DDD (degenerative disc disease), lumbar    2. Other spondylosis, lumbar region    3. Lumbar radiculitis    4. Alzheimer's dementia without behavioral disturbance, unspecified timing of dementia onset          Plan:  1. I have stressed the importance of physical activity and exercise to improve overall health  2. I think that the patient's back pain and radicular leg symptoms are due to degenerative disc disease and have recommended a lumbar epidural steroid injection to the L5-S1 level(s).  3. May consider lumbar MBB if above is not helpful  4. She can take norco 5mg as needed for acute flares of pain.  Hold for sedation  5. Follow up after procedure      Thank you for referring this interesting patient, and I look forward to continuing to collaborate in her care.     [General Appearance - In No Acute Distress] : in no acute distress [General Appearance - Well Nourished] : well nourished [Sclera] : the sclera and conjunctiva were normal [General Appearance - Well Developed] : well developed [PERRL With Normal Accommodation] : pupils were equal in size, round, and reactive to light [Extraocular Movements] : extraocular movements were intact [Outer Ear] : the ears and nose were normal in appearance [Neck Appearance] : the appearance of the neck was normal [Jugular Venous Distention Increased] : there was no jugular-venous distention [Auscultation Breath Sounds / Voice Sounds] : lungs were clear to auscultation bilaterally [Apical Impulse] : the apical impulse was normal [Heart Rate And Rhythm] : heart rate was normal and rhythm regular [Heart Sounds] : normal S1 and S2 [Bowel Sounds] : normal bowel sounds [Abdomen Soft] : soft [Abdomen Tenderness] : non-tender [Abdomen Mass (___ Cm)] : no abdominal mass palpated [No CVA Tenderness] : no ~M costovertebral angle tenderness [No Spinal Tenderness] : no spinal tenderness [Nail Clubbing] : no clubbing  or cyanosis of the fingernails [Motor Tone] : muscle strength and tone were normal [] : no rash [Skin Lesions] : no skin lesions [Sensation] : the sensory exam was normal to light touch and pinprick [Motor Exam] : the motor exam was normal [No Focal Deficits] : no focal deficits [Oriented To Time, Place, And Person] : oriented to person, place, and time [FreeTextEntry1] : +multiple tender points

## 2021-09-13 ENCOUNTER — PATIENT MESSAGE (OUTPATIENT)
Dept: FAMILY MEDICINE | Facility: CLINIC | Age: 86
End: 2021-09-13

## 2021-09-13 ENCOUNTER — TELEPHONE (OUTPATIENT)
Dept: FAMILY MEDICINE | Facility: CLINIC | Age: 86
End: 2021-09-13

## 2021-09-13 ENCOUNTER — OFFICE VISIT (OUTPATIENT)
Dept: FAMILY MEDICINE | Facility: CLINIC | Age: 86
End: 2021-09-13
Payer: MEDICARE

## 2021-09-13 VITALS
HEART RATE: 59 BPM | BODY MASS INDEX: 20.14 KG/M2 | DIASTOLIC BLOOD PRESSURE: 54 MMHG | HEIGHT: 64 IN | SYSTOLIC BLOOD PRESSURE: 100 MMHG | WEIGHT: 118 LBS

## 2021-09-13 DIAGNOSIS — H01.00B BLEPHARITIS OF UPPER AND LOWER EYELIDS OF BOTH EYES, UNSPECIFIED TYPE: Primary | ICD-10-CM

## 2021-09-13 DIAGNOSIS — E78.2 MIXED HYPERLIPIDEMIA: ICD-10-CM

## 2021-09-13 DIAGNOSIS — K64.9 HEMORRHOIDS, UNSPECIFIED HEMORRHOID TYPE: ICD-10-CM

## 2021-09-13 DIAGNOSIS — H05.20 EXOPHTHALMOS: ICD-10-CM

## 2021-09-13 DIAGNOSIS — R54 AGE-RELATED PHYSICAL DEBILITY: ICD-10-CM

## 2021-09-13 DIAGNOSIS — H01.00A BLEPHARITIS OF UPPER AND LOWER EYELIDS OF BOTH EYES, UNSPECIFIED TYPE: Primary | ICD-10-CM

## 2021-09-13 DIAGNOSIS — F02.80 ALZHEIMER'S DEMENTIA WITHOUT BEHAVIORAL DISTURBANCE, UNSPECIFIED TIMING OF DEMENTIA ONSET: ICD-10-CM

## 2021-09-13 DIAGNOSIS — Z91.81 AT RISK FOR FALLS: ICD-10-CM

## 2021-09-13 DIAGNOSIS — G30.9 ALZHEIMER'S DEMENTIA WITHOUT BEHAVIORAL DISTURBANCE, UNSPECIFIED TIMING OF DEMENTIA ONSET: ICD-10-CM

## 2021-09-13 DIAGNOSIS — L30.9 DERMATITIS: ICD-10-CM

## 2021-09-13 DIAGNOSIS — M15.9 PRIMARY OSTEOARTHRITIS INVOLVING MULTIPLE JOINTS: ICD-10-CM

## 2021-09-13 PROCEDURE — 1159F MED LIST DOCD IN RCRD: CPT | Mod: S$GLB,,, | Performed by: FAMILY MEDICINE

## 2021-09-13 PROCEDURE — 99214 OFFICE O/P EST MOD 30 MIN: CPT | Mod: S$GLB,,, | Performed by: FAMILY MEDICINE

## 2021-09-13 PROCEDURE — 99214 PR OFFICE/OUTPT VISIT, EST, LEVL IV, 30-39 MIN: ICD-10-PCS | Mod: S$GLB,,, | Performed by: FAMILY MEDICINE

## 2021-09-13 PROCEDURE — 3288F PR FALLS RISK ASSESSMENT DOCUMENTED: ICD-10-PCS | Mod: S$GLB,,, | Performed by: FAMILY MEDICINE

## 2021-09-13 PROCEDURE — 1160F RVW MEDS BY RX/DR IN RCRD: CPT | Mod: S$GLB,,, | Performed by: FAMILY MEDICINE

## 2021-09-13 PROCEDURE — 1159F PR MEDICATION LIST DOCUMENTED IN MEDICAL RECORD: ICD-10-PCS | Mod: S$GLB,,, | Performed by: FAMILY MEDICINE

## 2021-09-13 PROCEDURE — 1101F PR PT FALLS ASSESS DOC 0-1 FALLS W/OUT INJ PAST YR: ICD-10-PCS | Mod: S$GLB,,, | Performed by: FAMILY MEDICINE

## 2021-09-13 PROCEDURE — 1160F PR REVIEW ALL MEDS BY PRESCRIBER/CLIN PHARMACIST DOCUMENTED: ICD-10-PCS | Mod: S$GLB,,, | Performed by: FAMILY MEDICINE

## 2021-09-13 PROCEDURE — 3288F FALL RISK ASSESSMENT DOCD: CPT | Mod: S$GLB,,, | Performed by: FAMILY MEDICINE

## 2021-09-13 PROCEDURE — 1101F PT FALLS ASSESS-DOCD LE1/YR: CPT | Mod: S$GLB,,, | Performed by: FAMILY MEDICINE

## 2021-09-13 RX ORDER — DEXTROMETHORPHAN HYDROBROMIDE, GUAIFENESIN 5; 100 MG/5ML; MG/5ML
650 LIQUID ORAL EVERY 8 HOURS
Refills: 0
Start: 2021-09-13

## 2021-09-13 RX ORDER — MELATONIN 5 MG
CAPSULE ORAL DAILY
COMMUNITY

## 2021-09-13 RX ORDER — HYDROCORTISONE 25 MG/G
CREAM TOPICAL 2 TIMES DAILY
Qty: 28 G | Refills: 1 | Status: SHIPPED | OUTPATIENT
Start: 2021-09-13

## 2021-09-13 RX ORDER — OLOPATADINE HYDROCHLORIDE 2 MG/ML
1 SOLUTION/ DROPS OPHTHALMIC DAILY
Qty: 5 ML | Refills: 0 | Status: SHIPPED | OUTPATIENT
Start: 2021-09-13 | End: 2022-01-01

## 2021-09-14 ENCOUNTER — PATIENT MESSAGE (OUTPATIENT)
Dept: FAMILY MEDICINE | Facility: CLINIC | Age: 86
End: 2021-09-14

## 2021-09-14 ENCOUNTER — TELEPHONE (OUTPATIENT)
Dept: FAMILY MEDICINE | Facility: CLINIC | Age: 86
End: 2021-09-14

## 2021-09-15 ENCOUNTER — LAB VISIT (OUTPATIENT)
Dept: LAB | Facility: HOSPITAL | Age: 86
End: 2021-09-15
Attending: FAMILY MEDICINE
Payer: MEDICARE

## 2021-09-15 ENCOUNTER — TELEPHONE (OUTPATIENT)
Dept: FAMILY MEDICINE | Facility: CLINIC | Age: 86
End: 2021-09-15

## 2021-09-15 DIAGNOSIS — G30.9 ALZHEIMER'S DEMENTIA WITHOUT BEHAVIORAL DISTURBANCE, UNSPECIFIED TIMING OF DEMENTIA ONSET: Primary | ICD-10-CM

## 2021-09-15 DIAGNOSIS — G30.9 ALZHEIMER'S DEMENTIA WITHOUT BEHAVIORAL DISTURBANCE, UNSPECIFIED TIMING OF DEMENTIA ONSET: ICD-10-CM

## 2021-09-15 DIAGNOSIS — K64.9 HEMORRHOIDS, UNSPECIFIED HEMORRHOID TYPE: ICD-10-CM

## 2021-09-15 DIAGNOSIS — H05.20 EXOPHTHALMOS: ICD-10-CM

## 2021-09-15 DIAGNOSIS — F02.80 ALZHEIMER'S DEMENTIA WITHOUT BEHAVIORAL DISTURBANCE, UNSPECIFIED TIMING OF DEMENTIA ONSET: Primary | ICD-10-CM

## 2021-09-15 DIAGNOSIS — F02.80 ALZHEIMER'S DEMENTIA WITHOUT BEHAVIORAL DISTURBANCE, UNSPECIFIED TIMING OF DEMENTIA ONSET: ICD-10-CM

## 2021-09-15 LAB
BASOPHILS # BLD AUTO: 0.03 K/UL (ref 0–0.2)
BASOPHILS NFR BLD: 0.6 % (ref 0–1.9)
BILIRUB UR QL STRIP: NEGATIVE
CLARITY UR: ABNORMAL
COLOR UR: YELLOW
DIFFERENTIAL METHOD: ABNORMAL
EOSINOPHIL # BLD AUTO: 0.1 K/UL (ref 0–0.5)
EOSINOPHIL NFR BLD: 1.7 % (ref 0–8)
ERYTHROCYTE [DISTWIDTH] IN BLOOD BY AUTOMATED COUNT: 12.9 % (ref 11.5–14.5)
GLUCOSE UR QL STRIP: NEGATIVE
HCT VFR BLD AUTO: 39.2 % (ref 37–48.5)
HGB BLD-MCNC: 12.4 G/DL (ref 12–16)
HGB UR QL STRIP: ABNORMAL
HYALINE CASTS #/AREA URNS LPF: 0 /LPF
IMM GRANULOCYTES # BLD AUTO: 0.02 K/UL (ref 0–0.04)
IMM GRANULOCYTES NFR BLD AUTO: 0.4 % (ref 0–0.5)
KETONES UR QL STRIP: NEGATIVE
LEUKOCYTE ESTERASE UR QL STRIP: ABNORMAL
LYMPHOCYTES # BLD AUTO: 1.2 K/UL (ref 1–4.8)
LYMPHOCYTES NFR BLD: 24.9 % (ref 18–48)
MCH RBC QN AUTO: 28.2 PG (ref 27–31)
MCHC RBC AUTO-ENTMCNC: 31.6 G/DL (ref 32–36)
MCV RBC AUTO: 89 FL (ref 82–98)
MICROSCOPIC COMMENT: ABNORMAL
MONOCYTES # BLD AUTO: 0.4 K/UL (ref 0.3–1)
MONOCYTES NFR BLD: 8 % (ref 4–15)
NEUTROPHILS # BLD AUTO: 3.1 K/UL (ref 1.8–7.7)
NEUTROPHILS NFR BLD: 64.4 % (ref 38–73)
NITRITE UR QL STRIP: NEGATIVE
NRBC BLD-RTO: 0 /100 WBC
PH UR STRIP: 6 [PH] (ref 5–8)
PLATELET # BLD AUTO: 198 K/UL (ref 150–450)
PMV BLD AUTO: 10.3 FL (ref 9.2–12.9)
PROT UR QL STRIP: NEGATIVE
RBC # BLD AUTO: 4.39 M/UL (ref 4–5.4)
RBC #/AREA URNS HPF: 1 /HPF (ref 0–4)
SP GR UR STRIP: 1.02 (ref 1–1.03)
SQUAMOUS #/AREA URNS HPF: ABNORMAL /HPF
T4 FREE SERPL-MCNC: 0.93 NG/DL (ref 0.71–1.51)
TSH SERPL DL<=0.005 MIU/L-ACNC: 2.43 UIU/ML (ref 0.34–5.6)
URN SPEC COLLECT METH UR: ABNORMAL
UROBILINOGEN UR STRIP-ACNC: 1 EU/DL
WBC # BLD AUTO: 4.74 K/UL (ref 3.9–12.7)
WBC #/AREA URNS HPF: 20 /HPF (ref 0–5)
WBC CLUMPS URNS QL MICRO: ABNORMAL
YEAST URNS QL MICRO: ABNORMAL

## 2021-09-15 PROCEDURE — 87186 SC STD MICRODIL/AGAR DIL: CPT | Performed by: FAMILY MEDICINE

## 2021-09-15 PROCEDURE — 36415 COLL VENOUS BLD VENIPUNCTURE: CPT | Performed by: FAMILY MEDICINE

## 2021-09-15 PROCEDURE — 87077 CULTURE AEROBIC IDENTIFY: CPT | Performed by: FAMILY MEDICINE

## 2021-09-15 PROCEDURE — 85025 COMPLETE CBC W/AUTO DIFF WBC: CPT | Performed by: FAMILY MEDICINE

## 2021-09-15 PROCEDURE — 81001 URINALYSIS AUTO W/SCOPE: CPT | Performed by: FAMILY MEDICINE

## 2021-09-15 PROCEDURE — 84439 ASSAY OF FREE THYROXINE: CPT | Performed by: FAMILY MEDICINE

## 2021-09-15 PROCEDURE — 84443 ASSAY THYROID STIM HORMONE: CPT | Performed by: FAMILY MEDICINE

## 2021-09-15 PROCEDURE — 87086 URINE CULTURE/COLONY COUNT: CPT | Performed by: FAMILY MEDICINE

## 2021-09-17 LAB — BACTERIA UR CULT: ABNORMAL

## 2021-09-27 ENCOUNTER — TELEPHONE (OUTPATIENT)
Dept: FAMILY MEDICINE | Facility: CLINIC | Age: 86
End: 2021-09-27

## 2021-09-28 RX ORDER — AMOXICILLIN 500 MG/1
500 CAPSULE ORAL 3 TIMES DAILY
Qty: 21 CAPSULE | Refills: 0 | Status: SHIPPED | OUTPATIENT
Start: 2021-09-28 | End: 2021-10-05

## 2021-10-12 ENCOUNTER — OFFICE VISIT (OUTPATIENT)
Dept: OPHTHALMOLOGY | Facility: CLINIC | Age: 86
End: 2021-10-12
Payer: MEDICARE

## 2021-10-12 DIAGNOSIS — H02.22C MECHANICAL LAGOPHTHALMOS OF BOTH UPPER AND LOWER EYELIDS OF BOTH EYES: ICD-10-CM

## 2021-10-12 DIAGNOSIS — H00.15 CHALAZION LEFT LOWER EYELID: ICD-10-CM

## 2021-10-12 DIAGNOSIS — H25.13 NUCLEAR SCLEROTIC CATARACT, BILATERAL: ICD-10-CM

## 2021-10-12 DIAGNOSIS — H01.02B SQUAMOUS BLEPHARITIS OF UPPER AND LOWER EYELIDS OF BOTH EYES: Primary | ICD-10-CM

## 2021-10-12 DIAGNOSIS — H01.02A SQUAMOUS BLEPHARITIS OF UPPER AND LOWER EYELIDS OF BOTH EYES: Primary | ICD-10-CM

## 2021-10-12 PROCEDURE — 99999 PR PBB SHADOW E&M-EST. PATIENT-LVL III: CPT | Mod: PBBFAC,,, | Performed by: OPHTHALMOLOGY

## 2021-10-12 PROCEDURE — 99204 PR OFFICE/OUTPT VISIT, NEW, LEVL IV, 45-59 MIN: ICD-10-PCS | Mod: S$GLB,,, | Performed by: OPHTHALMOLOGY

## 2021-10-12 PROCEDURE — 1126F PR PAIN SEVERITY QUANTIFIED, NO PAIN PRESENT: ICD-10-PCS | Mod: CPTII,S$GLB,, | Performed by: OPHTHALMOLOGY

## 2021-10-12 PROCEDURE — 3288F PR FALLS RISK ASSESSMENT DOCUMENTED: ICD-10-PCS | Mod: CPTII,S$GLB,, | Performed by: OPHTHALMOLOGY

## 2021-10-12 PROCEDURE — 99999 PR PBB SHADOW E&M-EST. PATIENT-LVL III: ICD-10-PCS | Mod: PBBFAC,,, | Performed by: OPHTHALMOLOGY

## 2021-10-12 PROCEDURE — 1101F PT FALLS ASSESS-DOCD LE1/YR: CPT | Mod: CPTII,S$GLB,, | Performed by: OPHTHALMOLOGY

## 2021-10-12 PROCEDURE — 99204 OFFICE O/P NEW MOD 45 MIN: CPT | Mod: S$GLB,,, | Performed by: OPHTHALMOLOGY

## 2021-10-12 PROCEDURE — 1159F MED LIST DOCD IN RCRD: CPT | Mod: CPTII,S$GLB,, | Performed by: OPHTHALMOLOGY

## 2021-10-12 PROCEDURE — 1101F PR PT FALLS ASSESS DOC 0-1 FALLS W/OUT INJ PAST YR: ICD-10-PCS | Mod: CPTII,S$GLB,, | Performed by: OPHTHALMOLOGY

## 2021-10-12 PROCEDURE — 1159F PR MEDICATION LIST DOCUMENTED IN MEDICAL RECORD: ICD-10-PCS | Mod: CPTII,S$GLB,, | Performed by: OPHTHALMOLOGY

## 2021-10-12 PROCEDURE — 3288F FALL RISK ASSESSMENT DOCD: CPT | Mod: CPTII,S$GLB,, | Performed by: OPHTHALMOLOGY

## 2021-10-12 PROCEDURE — 1160F PR REVIEW ALL MEDS BY PRESCRIBER/CLIN PHARMACIST DOCUMENTED: ICD-10-PCS | Mod: CPTII,S$GLB,, | Performed by: OPHTHALMOLOGY

## 2021-10-12 PROCEDURE — 1160F RVW MEDS BY RX/DR IN RCRD: CPT | Mod: CPTII,S$GLB,, | Performed by: OPHTHALMOLOGY

## 2021-10-12 PROCEDURE — 1126F AMNT PAIN NOTED NONE PRSNT: CPT | Mod: CPTII,S$GLB,, | Performed by: OPHTHALMOLOGY

## 2021-10-12 RX ORDER — NEOMYCIN SULFATE, POLYMYXIN B SULFATE, AND DEXAMETHASONE 3.5; 10000; 1 MG/G; [USP'U]/G; MG/G
OINTMENT OPHTHALMIC 2 TIMES DAILY
Qty: 1 TUBE | Refills: 1 | Status: SHIPPED | OUTPATIENT
Start: 2021-10-12 | End: 2021-10-19

## 2022-01-01 ENCOUNTER — OFFICE VISIT (OUTPATIENT)
Dept: FAMILY MEDICINE | Facility: CLINIC | Age: 87
End: 2022-01-01
Payer: MEDICARE

## 2022-01-01 ENCOUNTER — DOCUMENT SCAN (OUTPATIENT)
Dept: HOME HEALTH SERVICES | Facility: HOSPITAL | Age: 87
End: 2022-01-01
Payer: MEDICARE

## 2022-01-01 ENCOUNTER — TELEPHONE (OUTPATIENT)
Dept: FAMILY MEDICINE | Facility: CLINIC | Age: 87
End: 2022-01-01

## 2022-01-01 ENCOUNTER — HOSPITAL ENCOUNTER (OUTPATIENT)
Dept: RADIOLOGY | Facility: HOSPITAL | Age: 87
Discharge: HOME | End: 2022-04-12
Attending: FAMILY MEDICINE
Payer: MEDICARE

## 2022-01-01 ENCOUNTER — EXTERNAL HOME HEALTH (OUTPATIENT)
Dept: HOME HEALTH SERVICES | Facility: HOSPITAL | Age: 87
End: 2022-01-01
Payer: MEDICARE

## 2022-01-01 ENCOUNTER — OFFICE VISIT (OUTPATIENT)
Dept: URGENT CARE | Facility: CLINIC | Age: 87
End: 2022-01-01
Payer: MEDICARE

## 2022-01-01 VITALS
OXYGEN SATURATION: 98 % | SYSTOLIC BLOOD PRESSURE: 92 MMHG | HEART RATE: 64 BPM | HEIGHT: 64 IN | DIASTOLIC BLOOD PRESSURE: 56 MMHG | BODY MASS INDEX: 17.07 KG/M2 | WEIGHT: 100 LBS

## 2022-01-01 VITALS
DIASTOLIC BLOOD PRESSURE: 60 MMHG | HEART RATE: 76 BPM | HEIGHT: 64 IN | SYSTOLIC BLOOD PRESSURE: 116 MMHG | BODY MASS INDEX: 17.93 KG/M2 | WEIGHT: 105 LBS | OXYGEN SATURATION: 98 %

## 2022-01-01 VITALS
HEART RATE: 66 BPM | OXYGEN SATURATION: 96 % | DIASTOLIC BLOOD PRESSURE: 69 MMHG | TEMPERATURE: 97 F | BODY MASS INDEX: 15.91 KG/M2 | SYSTOLIC BLOOD PRESSURE: 105 MMHG | HEIGHT: 64 IN | RESPIRATION RATE: 18 BRPM | WEIGHT: 93.19 LBS

## 2022-01-01 VITALS
WEIGHT: 111 LBS | DIASTOLIC BLOOD PRESSURE: 62 MMHG | HEIGHT: 64 IN | HEART RATE: 60 BPM | BODY MASS INDEX: 18.95 KG/M2 | SYSTOLIC BLOOD PRESSURE: 100 MMHG

## 2022-01-01 DIAGNOSIS — M25.559 HIP PAIN: ICD-10-CM

## 2022-01-01 DIAGNOSIS — H01.02B SQUAMOUS BLEPHARITIS OF UPPER AND LOWER EYELIDS OF BOTH EYES: Primary | ICD-10-CM

## 2022-01-01 DIAGNOSIS — G30.9 ALZHEIMER'S DEMENTIA WITHOUT BEHAVIORAL DISTURBANCE: ICD-10-CM

## 2022-01-01 DIAGNOSIS — S62.522A CLOSED FRACTURE OF TUFT OF DISTAL PHALANX OF LEFT THUMB: ICD-10-CM

## 2022-01-01 DIAGNOSIS — F02.80 ALZHEIMER'S DEMENTIA WITHOUT BEHAVIORAL DISTURBANCE, UNSPECIFIED TIMING OF DEMENTIA ONSET: Primary | ICD-10-CM
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
## 2022-01-01 DIAGNOSIS — E78.2 MIXED HYPERLIPIDEMIA: ICD-10-CM

## 2022-01-01 DIAGNOSIS — H01.02A SQUAMOUS BLEPHARITIS OF UPPER AND LOWER EYELIDS OF BOTH EYES: ICD-10-CM

## 2022-01-01 DIAGNOSIS — L89.221 PRESSURE INJURY OF LEFT HIP, STAGE 1: Primary | ICD-10-CM

## 2022-01-01 DIAGNOSIS — H01.02B SQUAMOUS BLEPHARITIS OF UPPER AND LOWER EYELIDS OF BOTH EYES: ICD-10-CM

## 2022-01-01 DIAGNOSIS — S50.311A ABRASION OF SKIN OF RIGHT ELBOW: ICD-10-CM

## 2022-01-01 DIAGNOSIS — G30.9 ALZHEIMER'S DEMENTIA WITHOUT BEHAVIORAL DISTURBANCE, UNSPECIFIED TIMING OF DEMENTIA ONSET: ICD-10-CM

## 2022-01-01 DIAGNOSIS — L89.221 PRESSURE INJURY OF LEFT HIP, STAGE 1: ICD-10-CM

## 2022-01-01 DIAGNOSIS — F02.80 ALZHEIMER'S DEMENTIA WITHOUT BEHAVIORAL DISTURBANCE: ICD-10-CM

## 2022-01-01 DIAGNOSIS — H01.02A SQUAMOUS BLEPHARITIS OF UPPER AND LOWER EYELIDS OF BOTH EYES: Primary | ICD-10-CM
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
## 2022-01-01 DIAGNOSIS — H05.20 EXOPHTHALMOS: ICD-10-CM

## 2022-01-01 DIAGNOSIS — R54 AGE-RELATED PHYSICAL DEBILITY: ICD-10-CM

## 2022-01-01 DIAGNOSIS — R63.4 WEIGHT LOSS, UNINTENTIONAL: ICD-10-CM

## 2022-01-01 DIAGNOSIS — K64.9 HEMORRHOIDS, UNSPECIFIED HEMORRHOID TYPE: ICD-10-CM

## 2022-01-01 DIAGNOSIS — H00.14 CHALAZION LEFT UPPER EYELID: ICD-10-CM

## 2022-01-01 DIAGNOSIS — G30.9 ALZHEIMER'S DEMENTIA WITHOUT BEHAVIORAL DISTURBANCE, UNSPECIFIED TIMING OF DEMENTIA ONSET: Primary | ICD-10-CM

## 2022-01-01 DIAGNOSIS — F02.80 ALZHEIMER'S DEMENTIA WITHOUT BEHAVIORAL DISTURBANCE, UNSPECIFIED TIMING OF DEMENTIA ONSET: ICD-10-CM

## 2022-01-01 PROCEDURE — 73502 X-RAY EXAM HIP UNI 2-3 VIEWS: CPT | Mod: TC,PO,RT

## 2022-01-01 PROCEDURE — 1159F MED LIST DOCD IN RCRD: CPT | Mod: S$GLB,,, | Performed by: FAMILY MEDICINE

## 2022-01-01 PROCEDURE — 1159F MED LIST DOCD IN RCRD: CPT | Mod: CPTII,S$GLB,, | Performed by: NURSE PRACTITIONER

## 2022-01-01 PROCEDURE — 1160F PR REVIEW ALL MEDS BY PRESCRIBER/CLIN PHARMACIST DOCUMENTED: ICD-10-PCS | Mod: CPTII,S$GLB,, | Performed by: NURSE PRACTITIONER

## 2022-01-01 PROCEDURE — G0180 PR HOME HEALTH MD CERTIFICATION: ICD-10-PCS | Mod: ,,, | Performed by: FAMILY MEDICINE

## 2022-01-01 PROCEDURE — 99214 OFFICE O/P EST MOD 30 MIN: CPT | Mod: S$GLB,,, | Performed by: FAMILY MEDICINE

## 2022-01-01 PROCEDURE — 1160F RVW MEDS BY RX/DR IN RCRD: CPT | Mod: S$GLB,,, | Performed by: FAMILY MEDICINE

## 2022-01-01 PROCEDURE — 99214 PR OFFICE/OUTPT VISIT, EST, LEVL IV, 30-39 MIN: ICD-10-PCS | Mod: S$GLB,,, | Performed by: FAMILY MEDICINE

## 2022-01-01 PROCEDURE — 99203 OFFICE O/P NEW LOW 30 MIN: CPT | Mod: S$GLB,,, | Performed by: NURSE PRACTITIONER

## 2022-01-01 PROCEDURE — 1160F RVW MEDS BY RX/DR IN RCRD: CPT | Mod: CPTII,S$GLB,, | Performed by: NURSE PRACTITIONER

## 2022-01-01 PROCEDURE — 1159F PR MEDICATION LIST DOCUMENTED IN MEDICAL RECORD: ICD-10-PCS | Mod: S$GLB,,, | Performed by: FAMILY MEDICINE

## 2022-01-01 PROCEDURE — 1159F PR MEDICATION LIST DOCUMENTED IN MEDICAL RECORD: ICD-10-PCS | Mod: CPTII,S$GLB,, | Performed by: NURSE PRACTITIONER

## 2022-01-01 PROCEDURE — 1125F PR PAIN SEVERITY QUANTIFIED, PAIN PRESENT: ICD-10-PCS | Mod: S$GLB,,, | Performed by: FAMILY MEDICINE

## 2022-01-01 PROCEDURE — 99203 PR OFFICE/OUTPT VISIT, NEW, LEVL III, 30-44 MIN: ICD-10-PCS | Mod: S$GLB,,, | Performed by: NURSE PRACTITIONER

## 2022-01-01 PROCEDURE — G0180 MD CERTIFICATION HHA PATIENT: HCPCS | Mod: ,,, | Performed by: FAMILY MEDICINE

## 2022-01-01 PROCEDURE — 1160F PR REVIEW ALL MEDS BY PRESCRIBER/CLIN PHARMACIST DOCUMENTED: ICD-10-PCS | Mod: S$GLB,,, | Performed by: FAMILY MEDICINE

## 2022-01-01 PROCEDURE — 1125F AMNT PAIN NOTED PAIN PRSNT: CPT | Mod: S$GLB,,, | Performed by: FAMILY MEDICINE

## 2022-01-01 RX ORDER — SIMVASTATIN 40 MG/1
40 TABLET, FILM COATED ORAL NIGHTLY
Qty: 90 TABLET | Refills: 3 | Status: SHIPPED | OUTPATIENT
Start: 2022-01-01

## 2022-01-01 RX ORDER — ACETAMINOPHEN AND CODEINE PHOSPHATE 300; 60 MG/1; MG/1
1 TABLET ORAL EVERY 4 HOURS PRN
Qty: 40 TABLET | Refills: 0 | Status: SHIPPED | OUTPATIENT
Start: 2022-01-01 | End: 2022-01-01

## 2022-01-01 RX ORDER — MUPIROCIN 20 MG/G
OINTMENT TOPICAL 3 TIMES DAILY
Qty: 30 G | Refills: 1 | Status: SHIPPED | OUTPATIENT
Start: 2022-01-01

## 2022-01-01 RX ORDER — NEOMYCIN SULFATE, POLYMYXIN B SULFATE, AND DEXAMETHASONE 3.5; 10000; 1 MG/G; [USP'U]/G; MG/G
OINTMENT OPHTHALMIC EVERY 8 HOURS
Qty: 3.5 G | Refills: 2 | Status: SHIPPED | OUTPATIENT
Start: 2022-01-01 | End: 2022-01-01

## 2022-01-01 RX ORDER — DONEPEZIL HYDROCHLORIDE 10 MG/1
10 TABLET, FILM COATED ORAL NIGHTLY
Qty: 90 TABLET | Refills: 3 | Status: SHIPPED | OUTPATIENT
Start: 2022-01-01

## 2022-01-01 RX ORDER — FOLIC ACID 1 MG/1
1000 TABLET ORAL 2 TIMES DAILY
COMMUNITY

## 2022-01-01 RX ORDER — MEMANTINE HYDROCHLORIDE 10 MG/1
10 TABLET ORAL 2 TIMES DAILY
Qty: 180 TABLET | Refills: 3 | Status: SHIPPED | OUTPATIENT
Start: 2022-01-01

## 2022-01-12 NOTE — PROGRESS NOTES
SUBJECTIVE:    Patient ID: Kojo Villa is a 87 y.o. female.    Chief Complaint: Follow-up (No bottles, went over meds verbally, no refills needed at this time// SW)    Patient Alzheimer's dementia is accompanied today by her son and her caregiver.  She has recently been seen by Ophthalmology for issues with red irritated eyes.  Symptoms are now improving with therapy.  She has some issues with arthritis that Tylenol helps.  Mood and behavior has been stable.  No recent acute labs changes.  Has some issues with anxiety and picking at her skin but this is improving as well.  No falls and no acute issues.       Lab Visit on 09/15/2021   Component Date Value Ref Range Status    WBC 09/15/2021 4.74  3.90 - 12.70 K/uL Final    RBC 09/15/2021 4.39  4.00 - 5.40 M/uL Final    Hemoglobin 09/15/2021 12.4  12.0 - 16.0 g/dL Final    Hematocrit 09/15/2021 39.2  37.0 - 48.5 % Final    MCV 09/15/2021 89  82 - 98 fL Final    MCH 09/15/2021 28.2  27.0 - 31.0 pg Final    MCHC 09/15/2021 31.6* 32.0 - 36.0 g/dL Final    RDW 09/15/2021 12.9  11.5 - 14.5 % Final    Platelets 09/15/2021 198  150 - 450 K/uL Final    MPV 09/15/2021 10.3  9.2 - 12.9 fL Final    Immature Granulocytes 09/15/2021 0.4  0.0 - 0.5 % Final    Gran # (ANC) 09/15/2021 3.1  1.8 - 7.7 K/uL Final    Immature Grans (Abs) 09/15/2021 0.02  0.00 - 0.04 K/uL Final    Lymph # 09/15/2021 1.2  1.0 - 4.8 K/uL Final    Mono # 09/15/2021 0.4  0.3 - 1.0 K/uL Final    Eos # 09/15/2021 0.1  0.0 - 0.5 K/uL Final    Baso # 09/15/2021 0.03  0.00 - 0.20 K/uL Final    nRBC 09/15/2021 0  0 /100 WBC Final    Gran % 09/15/2021 64.4  38.0 - 73.0 % Final    Lymph % 09/15/2021 24.9  18.0 - 48.0 % Final    Mono % 09/15/2021 8.0  4.0 - 15.0 % Final    Eosinophil % 09/15/2021 1.7  0.0 - 8.0 % Final    Basophil % 09/15/2021 0.6  0.0 - 1.9 % Final    Differential Method 09/15/2021 Automated   Final    Specimen UA 09/15/2021 Urine, Clean Catch   Final    Color, UA  09/15/2021 Yellow  Yellow, Straw, Amanda Final    Appearance, UA 09/15/2021 Hazy* Clear Final    pH, UA 09/15/2021 6.0  5.0 - 8.0 Final    Specific Gravity, UA 09/15/2021 1.020  1.005 - 1.030 Final    Protein, UA 09/15/2021 Negative  Negative Final    Glucose, UA 09/15/2021 Negative  Negative Final    Ketones, UA 09/15/2021 Negative  Negative Final    Bilirubin (UA) 09/15/2021 Negative  Negative Final    Occult Blood UA 09/15/2021 1+* Negative Final    Nitrite, UA 09/15/2021 Negative  Negative Final    Urobilinogen, UA 09/15/2021 1.0  Negative EU/dL Final    Leukocytes, UA 09/15/2021 2+* Negative Final    TSH 09/15/2021 2.430  0.340 - 5.600 uIU/mL Final    Free T4 09/15/2021 0.93  0.71 - 1.51 ng/dL Final    RBC, UA 09/15/2021 1  0 - 4 /hpf Final    WBC, UA 09/15/2021 20* 0 - 5 /hpf Final    WBC Clumps, UA 09/15/2021 None  None-Rare Final    Yeast, UA 09/15/2021 None  None Final    Squam Epithel, UA 09/15/2021 few  /hpf Final    Hyaline Casts, UA 09/15/2021 0  0-1/lpf /lpf Final    Microscopic Comment 09/15/2021 SEE COMMENT   Final    Urine Culture, Routine 09/15/2021 *  Final                    Value:ESCHERICHIA COLI  50,000 - 99,999 cfu/ml         Past Medical History:   Diagnosis Date    Alzheimer's disease     Back pain     Colon polyps     Hyperlipemia     Skin cancer     skin     Past Surgical History:   Procedure Laterality Date    EPIDURAL STEROID INJECTION INTO LUMBAR SPINE N/A 2/7/2019    Procedure: Injection-steroid-epidural-lumbar;  Surgeon: Jose Escalante MD;  Location: [facility];  Service: Pain Management;  Laterality: N/A;  L5-S1    SKIN CANCER EXCISION       Family History   Problem Relation Age of Onset    Cancer Mother     Cancer Sister     Cancer Brother        Marital Status:   Alcohol History:  reports current alcohol use of about 1.0 standard drink of alcohol per week.  Tobacco History:  reports that she has never smoked. She has never used smokeless  "tobacco.  Drug History:  reports no history of drug use.    Review of patient's allergies indicates:   Allergen Reactions    Sulfa (sulfonamide antibiotics) Other (See Comments)     Mother told her when she was a child she was allergic       Current Outpatient Medications:     acetaminophen (TYLENOL) 650 MG TbSR, Take 1 tablet (650 mg total) by mouth every 8 (eight) hours., Disp: , Rfl: 0    folic acid (FOLVITE) 1 MG tablet, Take 1,000 mcg by mouth 2 (two) times daily., Disp: , Rfl:     hydrocortisone 2.5 % cream, Apply topically 2 (two) times daily. For hemmorhoids, Disp: 28 g, Rfl: 1    melatonin 5 mg Cap, Take by mouth once daily., Disp: , Rfl:     VITAMIN B-12 1000 MCG tablet, TK 1 T PO D, Disp: , Rfl: 0    donepeziL (ARICEPT) 10 MG tablet, Take 1 tablet (10 mg total) by mouth every evening., Disp: 90 tablet, Rfl: 3    memantine (NAMENDA) 10 MG Tab, Take 1 tablet (10 mg total) by mouth 2 (two) times daily., Disp: 180 tablet, Rfl: 3    simvastatin (ZOCOR) 40 MG tablet, Take 1 tablet (40 mg total) by mouth every evening., Disp: 90 tablet, Rfl: 3  No current facility-administered medications for this visit.    Facility-Administered Medications Ordered in Other Visits:     lactated ringers infusion, , Intravenous, Once PRN, Jose Escalante MD    Review of Systems   Unable to perform ROS: Dementia          Objective:      Vitals:    01/12/22 1104   BP: 100/62   Pulse: 60   Weight: 50.3 kg (111 lb)   Height: 5' 4" (1.626 m)     Physical Exam  Constitutional:       General: She is not in acute distress.     Appearance: Normal appearance.   HENT:      Head: Normocephalic and atraumatic.      Mouth/Throat:      Mouth: Mucous membranes are moist.   Eyes:      Conjunctiva/sclera:      Right eye: Right conjunctiva is injected.      Left eye: Left conjunctiva is injected.   Pulmonary:      Effort: Pulmonary effort is normal.   Musculoskeletal:         General: Normal range of motion.      Right lower leg: No edema.     "  Left lower leg: No edema.   Neurological:      General: No focal deficit present.      Mental Status: She is alert. Mental status is at baseline.      Cranial Nerves: No cranial nerve deficit.      Motor: No tremor.   Psychiatric:         Mood and Affect: Mood normal. Affect is flat.         Behavior: Behavior normal.           Assessment:       1. Alzheimer's dementia without behavioral disturbance, unspecified timing of dementia onset    2. Mixed hyperlipidemia    3. Squamous blepharitis of upper and lower eyelids of both eyes    4. Hemorrhoids, unspecified hemorrhoid type         Plan:       Alzheimer's dementia without behavioral disturbance, unspecified timing of dementia onset  -     donepeziL (ARICEPT) 10 MG tablet; Take 1 tablet (10 mg total) by mouth every evening.  Dispense: 90 tablet; Refill: 3  -     memantine (NAMENDA) 10 MG Tab; Take 1 tablet (10 mg total) by mouth 2 (two) times daily.  Dispense: 180 tablet; Refill: 3    Mixed hyperlipidemia  -     simvastatin (ZOCOR) 40 MG tablet; Take 1 tablet (40 mg total) by mouth every evening.  Dispense: 90 tablet; Refill: 3    Squamous blepharitis of upper and lower eyelids of both eyes  Continue eyedrops    Hemorrhoids, unspecified hemorrhoid type  Symptoms improved    Follow up in about 6 months (around 7/12/2022) for cholesterol .

## 2022-04-11 NOTE — TELEPHONE ENCOUNTER
Spoke with patients son Jose.  States patient began c/o R hip/thigh pain after fall.  Mentioned to hand specialist who informed patient will need xray and referral to ortho.  Ov scheduled 3:40pm tomorrow with dr mckeon.  Xray order pended -DN

## 2022-04-11 NOTE — TELEPHONE ENCOUNTER
----- Message from Raya Haley sent at 4/11/2022 12:16 PM CDT -----  Patient son (Anton)called and stated that the patient fell last weekend and he took her to urgent care and they referred her to an orthopedic but the doctor they sent her to only treat hands and they advised him to contact her doctor to order an xray please give him a call [phone] he would like to have it done today

## 2022-04-13 NOTE — PROGRESS NOTES
SUBJECTIVE:    Patient ID: Kojo Villa is a 87 y.o. female.    Chief Complaint: Hip Pain (Right x1 week, has taken tylenol, had x-rays yesterday// SW)    The patient past medical history of Alzheimer's dementia is brought in today with her son and caretaker.  For patient had been having some issues with right hip pain for the past week.  Symptoms began after a fall.  X-ray of the right hip were performed ER yesterday demonstrated no fracture of the hip but she did also injure her finger . A tuft fracture is notes on xray.   Her caretaker mentions she frequently appears uncomfortable when trying to get out of bed in the mornign but patient isn't a complainer      Past Medical History:   Diagnosis Date    Alzheimer's disease     Back pain     Colon polyps     Hyperlipemia     Skin cancer     skin     Past Surgical History:   Procedure Laterality Date    EPIDURAL STEROID INJECTION INTO LUMBAR SPINE N/A 2/7/2019    Procedure: Injection-steroid-epidural-lumbar;  Surgeon: Jose Escalante MD;  [facility];  Service: Pain Management;  Laterality: N/A;  L5-S1    SKIN CANCER EXCISION       Family History   Problem Relation Age of Onset    Cancer Mother     Cancer Sister     Cancer Brother        Marital Status:   Alcohol History:  reports current alcohol use of about 1.0 standard drink of alcohol per week.  Tobacco History:  reports that she has never smoked. She has never used smokeless tobacco.  Drug History:  reports no history of drug use.    Review of patient's allergies indicates:   Allergen Reactions    Sulfa (sulfonamide antibiotics) Other (See Comments)     Mother told her when she was a child she was allergic       Current Outpatient Medications:     acetaminophen (TYLENOL) 650 MG TbSR, Take 1 tablet (650 mg total) by mouth every 8 (eight) hours., Disp: , Rfl: 0    donepeziL (ARICEPT) 10 MG tablet, Take 1 tablet (10 mg total) by mouth every evening., Disp: 90 tablet, Rfl: 3    folic  "acid (FOLVITE) 1 MG tablet, Take 1,000 mcg by mouth 2 (two) times daily., Disp: , Rfl:     hydrocortisone 2.5 % cream, Apply topically 2 (two) times daily. For hemmorhoids, Disp: 28 g, Rfl: 1    melatonin 5 mg Cap, Take by mouth once daily., Disp: , Rfl:     memantine (NAMENDA) 10 MG Tab, Take 1 tablet (10 mg total) by mouth 2 (two) times daily., Disp: 180 tablet, Rfl: 3    simvastatin (ZOCOR) 40 MG tablet, Take 1 tablet (40 mg total) by mouth every evening., Disp: 90 tablet, Rfl: 3    VITAMIN B-12 1000 MCG tablet, TK 1 T PO D, Disp: , Rfl: 0  No current facility-administered medications for this visit.    Facility-Administered Medications Ordered in Other Visits:     lactated ringers infusion, , Intravenous, Once PRN, Jose Escalante MD    Review of Systems       Objective:      Vitals:    04/13/22 1103   BP: 116/60   Pulse: 76   SpO2: 98%   Weight: 47.6 kg (105 lb)   Height: 5' 4" (1.626 m)     Physical Exam  Constitutional:       Appearance: Normal appearance.   HENT:      Head: Normocephalic and atraumatic.      Comments: Bilateral exophtalmos     Mouth/Throat:      Mouth: Mucous membranes are moist.   Eyes:      Conjunctiva/sclera: Conjunctivae normal.   Pulmonary:      Effort: Pulmonary effort is normal.   Musculoskeletal:      Comments: bruise to left thumb   Neurological:      General: No focal deficit present.      Mental Status: She is alert.      Comments: Shuffling gait   Psychiatric:         Mood and Affect: Mood normal.         Behavior: Behavior normal.           Assessment:       1. Fall, initial encounter    2. Hip pain    3. Age-related physical debility    4. Closed fracture of tuft of distal phalanx of left thumb    5. Exophthalmos    6. Alzheimer's dementia without behavioral disturbance, unspecified timing of dementia onset         Plan:       Fall, initial encounter  Family will do additional monitoring    Hip pain  -     acetaminophen-codeine 300-60mg (TYLENOL #4) 300-60 mg Tab; Take 1 " tablet by mouth every 4 (four) hours as needed.  Dispense: 40 tablet; Refill: 0    Age-related physical debility    Closed fracture of tuft of distal phalanx of left thumb  Protect with bandage until healed    Exophthalmos  Not thyroid related. Has been evaluated by ophtho     Alzheimer's dementia without behavioral disturbance, unspecified timing of dementia onset      Follow up if symptoms worsen or fail to improve.

## 2022-07-13 NOTE — PROGRESS NOTES
SUBJECTIVE:    Patient ID: Kojo Villa is a 88 y.o. female.    Chief Complaint: Follow-up (6mth, went over meds verbally// SW)    Patient with dementia is brought in by her son and her personal care attendant.  There are having some concerns about her having weight loss despite having a good appetite.  There has been a documented 10 lb weight loss since our last visit.  They note that she is having frequent stooling but not diarrhea.  She continues to have some issues with her eyes.  Has follow-up with ophthalmology for persistent blepharitis.  No significant issues with behaviors.  He she does continued to scratch and pick at her skin but no wounds.  No falls.       No visits with results within 6 Month(s) from this visit.   Latest known visit with results is:   Lab Visit on 09/15/2021   Component Date Value Ref Range Status    WBC 09/15/2021 4.74  3.90 - 12.70 K/uL Final    RBC 09/15/2021 4.39  4.00 - 5.40 M/uL Final    Hemoglobin 09/15/2021 12.4  12.0 - 16.0 g/dL Final    Hematocrit 09/15/2021 39.2  37.0 - 48.5 % Final    MCV 09/15/2021 89  82 - 98 fL Final    MCH 09/15/2021 28.2  27.0 - 31.0 pg Final    MCHC 09/15/2021 31.6 (L)  32.0 - 36.0 g/dL Final    RDW 09/15/2021 12.9  11.5 - 14.5 % Final    Platelets 09/15/2021 198  150 - 450 K/uL Final    MPV 09/15/2021 10.3  9.2 - 12.9 fL Final    Immature Granulocytes 09/15/2021 0.4  0.0 - 0.5 % Final    Gran # (ANC) 09/15/2021 3.1  1.8 - 7.7 K/uL Final    Immature Grans (Abs) 09/15/2021 0.02  0.00 - 0.04 K/uL Final    Lymph # 09/15/2021 1.2  1.0 - 4.8 K/uL Final    Mono # 09/15/2021 0.4  0.3 - 1.0 K/uL Final    Eos # 09/15/2021 0.1  0.0 - 0.5 K/uL Final    Baso # 09/15/2021 0.03  0.00 - 0.20 K/uL Final    nRBC 09/15/2021 0  0 /100 WBC Final    Gran % 09/15/2021 64.4  38.0 - 73.0 % Final    Lymph % 09/15/2021 24.9  18.0 - 48.0 % Final    Mono % 09/15/2021 8.0  4.0 - 15.0 % Final    Eosinophil % 09/15/2021 1.7  0.0 - 8.0 % Final    Basophil % 09/15/2021 0.6  0.0  - 1.9 % Final    Differential Method 09/15/2021 Automated   Final    Specimen UA 09/15/2021 Urine, Clean Catch   Final    Color, UA 09/15/2021 Yellow  Yellow, Straw, Amanda Final    Appearance, UA 09/15/2021 Hazy (A)  Clear Final    pH, UA 09/15/2021 6.0  5.0 - 8.0 Final    Specific Gravity, UA 09/15/2021 1.020  1.005 - 1.030 Final    Protein, UA 09/15/2021 Negative  Negative Final    Glucose, UA 09/15/2021 Negative  Negative Final    Ketones, UA 09/15/2021 Negative  Negative Final    Bilirubin (UA) 09/15/2021 Negative  Negative Final    Occult Blood UA 09/15/2021 1+ (A)  Negative Final    Nitrite, UA 09/15/2021 Negative  Negative Final    Urobilinogen, UA 09/15/2021 1.0  Negative EU/dL Final    Leukocytes, UA 09/15/2021 2+ (A)  Negative Final    TSH 09/15/2021 2.430  0.340 - 5.600 uIU/mL Final    Free T4 09/15/2021 0.93  0.71 - 1.51 ng/dL Final    RBC, UA 09/15/2021 1  0 - 4 /hpf Final    WBC, UA 09/15/2021 20 (H)  0 - 5 /hpf Final    WBC Clumps, UA 09/15/2021 None  None-Rare Final    Yeast, UA 09/15/2021 None  None Final    Squam Epithel, UA 09/15/2021 few  /hpf Final    Hyaline Casts, UA 09/15/2021 0  0-1/lpf /lpf Final    Microscopic Comment 09/15/2021 SEE COMMENT   Final    Urine Culture, Routine 09/15/2021  (A)   Final                    Value:ESCHERICHIA COLI  50,000 - 99,999 cfu/ml         Past Medical History:   Diagnosis Date    Alzheimer's disease     Back pain     Colon polyps     Hyperlipemia     Skin cancer     skin     Past Surgical History:   Procedure Laterality Date    EPIDURAL STEROID INJECTION INTO LUMBAR SPINE N/A 2/7/2019    Procedure: Injection-steroid-epidural-lumbar;  Surgeon: Jose Escalante MD;  Location: [facility];  Service: Pain Management;  Laterality: N/A;  L5-S1    SKIN CANCER EXCISION       Family History   Problem Relation Age of Onset    Cancer Mother     Cancer Sister     Cancer Brother        Marital Status:   Alcohol History:  reports current alcohol use of about 1.0 standard  "drink per week.  Tobacco History:  reports that she has never smoked. She has never used smokeless tobacco.  Drug History:  reports no history of drug use.    Review of patient's allergies indicates:   Allergen Reactions    Sulfa (sulfonamide antibiotics) Other (See Comments)     Mother told her when she was a child she was allergic       Current Outpatient Medications:     acetaminophen (TYLENOL) 650 MG TbSR, Take 1 tablet (650 mg total) by mouth every 8 (eight) hours., Disp: , Rfl: 0    donepeziL (ARICEPT) 10 MG tablet, Take 1 tablet (10 mg total) by mouth every evening., Disp: 90 tablet, Rfl: 3    folic acid (FOLVITE) 1 MG tablet, Take 1,000 mcg by mouth 2 (two) times daily., Disp: , Rfl:     hydrocortisone 2.5 % cream, Apply topically 2 (two) times daily. For hemmorhoids, Disp: 28 g, Rfl: 1    melatonin 5 mg Cap, Take by mouth once daily., Disp: , Rfl:     memantine (NAMENDA) 10 MG Tab, Take 1 tablet (10 mg total) by mouth 2 (two) times daily., Disp: 180 tablet, Rfl: 3    simvastatin (ZOCOR) 40 MG tablet, Take 1 tablet (40 mg total) by mouth every evening., Disp: 90 tablet, Rfl: 3    VITAMIN B-12 1000 MCG tablet, TK 1 T PO D, Disp: , Rfl: 0    mupirocin (BACTROBAN) 2 % ointment, Apply topically 3 (three) times daily., Disp: 30 g, Rfl: 1    Review of Systems   All other systems reviewed and are negative.       Objective:      Vitals:    07/13/22 1128   BP: (!) 92/56   Pulse: 64   SpO2: 98%   Weight: 45.4 kg (100 lb)   Height: 5' 4" (1.626 m)     Physical Exam  Constitutional:       Appearance: Normal appearance.      Comments: Frail elderly   HENT:      Head: Normocephalic and atraumatic.      Mouth/Throat:      Mouth: Mucous membranes are moist.   Eyes:      Conjunctiva/sclera: Conjunctivae normal.      Comments: Bilateral exophthalmos.  Mild swelling to upper lids.   Pulmonary:      Effort: Pulmonary effort is normal.   Skin:     Findings: Bruising present.   Neurological:      General: No focal deficit " present.      Mental Status: She is alert and oriented to person, place, and time.   Psychiatric:         Attention and Perception: Attention normal.         Mood and Affect: Mood normal.         Behavior: Behavior normal. Behavior is cooperative.         Assessment:       1. Squamous blepharitis of upper and lower eyelids of both eyes    2. Chalazion left upper eyelid    3. Alzheimer's dementia without behavioral disturbance, unspecified timing of dementia onset    4. Mixed hyperlipidemia    5. Weight loss, unintentional         Plan:       Squamous blepharitis of upper and lower eyelids of both eyes    Chalazion left upper eyelid  -     neomycin-polymyxin-dexamethasone (DEXACINE) 3.5 mg/g-10,000 unit/g-0.1 % Oint; Place into the left eye every 8 (eight) hours. for 10 days  Dispense: 3.5 g; Refill: 2    Alzheimer's dementia without behavioral disturbance, unspecified timing of dementia onset    Mixed hyperlipidemia    Weight loss, unintentional    Follow up in about 6 months (around 1/13/2023).

## 2022-10-03 NOTE — PATIENT INSTRUCTIONS
Mupirocin ointment to right elbow abrasion and left hip 3 times daily  Keep left hip covered with ointment and a dressing  Call Dr Amador office to request home health with wound care  Egg crate mattress to cover her mattress to prevent further skin break down  Increase her protein intake to promote healing

## 2022-10-03 NOTE — PROGRESS NOTES
"Subjective:       Patient ID: Kojo Villa is a 88 y.o. female.    Vitals:  height is 5' 4" (1.626 m) and weight is 42.3 kg (93 lb 3.2 oz). Her temperature is 97 °F (36.1 °C). Her blood pressure is 105/69 and her pulse is 66. Her respiration is 18 and oxygen saturation is 96%.     Chief Complaint: Fall    88 year old female with PMH of dementia accompanied by her caregiver. Caregiver states that pt had a fall on 10/01/22 and scraped right elbow, caregiver states pt has sore on left hip. She prefers to lay on her left hip.    Fall    Skin:  Positive for abrasion (Right elbow) and erythema (Right hip).     Objective:      Physical Exam   Constitutional: She is oriented to person, place, and time.   HENT:   Head: Normocephalic and atraumatic.   Nose: Nose normal.   Mouth/Throat: Mucous membranes are dry. Oropharynx is clear.   Eyes: Conjunctivae are normal. Extraocular movement intact   Neck: Neck supple.   Cardiovascular: Normal rate, regular rhythm, normal heart sounds and normal pulses.   Pulmonary/Chest: Effort normal and breath sounds normal.   Abdominal: Soft.   Musculoskeletal: Normal range of motion.         General: Normal range of motion.   Neurological: She is alert and oriented to person, place, and time.   Skin: Capillary refill takes 2 to 3 seconds. erythema (Right hip)        Psychiatric: Her behavior is normal. Mood normal.   Nursing note and vitals reviewed.      Assessment:       1. Fall, initial encounter    2. Abrasion of skin of right elbow    3. Pressure injury of left hip, stage 1          Plan:         Fall, initial encounter    Abrasion of skin of right elbow  -     mupirocin (BACTROBAN) 2 % ointment; Apply topically 3 (three) times daily.  Dispense: 30 g; Refill: 1    Pressure injury of left hip, stage 1  -     mupirocin (BACTROBAN) 2 % ointment; Apply topically 3 (three) times daily.  Dispense: 30 g; Refill: 1       Mupirocin ointment to right elbow abrasion and left hip 3 times " daily  Keep left hip covered with ointment and a dressing  Call Dr Amador office to request home health with wound care  Egg crate mattress to cover her mattress to prevent further skin break down  Increase her protein intake to promote healing

## 2022-10-07 NOTE — TELEPHONE ENCOUNTER
Contact information to concerned care home health and hospice given to son kostas.  Informed to return call with questions/concerns -DN

## 2022-10-07 NOTE — TELEPHONE ENCOUNTER
----- Message from Raya Haley sent at 10/7/2022 10:36 AM CDT -----  Patient son (kostas)  called and stated that his sister has called and left a message about getting the patient set up for wound care and nobody has called them back and the patient need to be seen ASAP [phone]

## 2022-10-17 NOTE — TELEPHONE ENCOUNTER
----- Message from Raya Haley sent at 10/17/2022  9:59 AM CDT -----  Patient Jose called and stated that a care company said that they were sending a request to Dr Amador requesting a hospital bed and oxygen and they bed need to have the pad for pressure points and he was calling to see if the doctor received the information please call him and advise at [phone]

## 2022-11-16 NOTE — TELEPHONE ENCOUNTER
----- Message from Veronika Lamb sent at 11/15/2022  3:37 PM CST -----  Natalie dale phn is calling to get clinical notes   Fax [phone]   Phone [phone]

## 2022-12-01 ENCOUNTER — TELEPHONE (OUTPATIENT)
Dept: FAMILY MEDICINE | Facility: CLINIC | Age: 87
End: 2022-12-01

## 2022-12-01 NOTE — TELEPHONE ENCOUNTER
----- Message from Leonardo Rollins MA sent at 2022  3:13 PM CST -----  Regarding: Death certificate  Karina calling from [facility] to let  know the death certificate is in [place]ers awaiting her signature. [phone]

## 2022-12-06 ENCOUNTER — TELEPHONE (OUTPATIENT)
Dept: FAMILY MEDICINE | Facility: CLINIC | Age: 87
End: 2022-12-06

## 2022-12-06 NOTE — TELEPHONE ENCOUNTER
----- Message from Veronika Lamb sent at 2022  1:22 PM CST -----  Tameka la with [facility]  [employer] is calling about the death certificate   [phone]

## 2022-12-08 ENCOUNTER — DOCUMENT SCAN (OUTPATIENT)
Dept: HOME HEALTH SERVICES | Facility: HOSPITAL | Age: 87
End: 2022-12-08
Payer: MEDICARE

## 2023-01-01 NOTE — OP NOTE
PROCEDURE DATE: 2/7/2019    Procedure:   Interlaminar epidural steroid injection at L5-S1 under fluoroscopic guidance.    Diagnosis: lUMBAR DISC DISPLACEMENT WITHOUT MYELOPATHY  pOSTOP DIAGNOSIS: sAME    Physician: Jose Escalante M.D.    Medications injected:10 mg dexamethasone with 4 ml of preservative free NaCl    Local anesthetic injected:    Lidocaine 1% 2 ml total    Sedation Medications: None    Estimated blood loss:  None    Complications:  None    Technique:  Time-out taken to identify patient and procedure prior to starting the procedure.  With the patient laying in a prone position, the area was prepped and draped in the usual sterile fashion using ChloraPrep and a fenestrated drape.  After determining the target level with an AP fluoroscopic view, local anesthetic was given using a 25-gauge 1.5 inch needle by raising a wheal and then infiltrating toward the interlaminar entry space.  A 3.5inch 20-gauge Touhy needle was introduced under AP fluoroscopic guidance to the interlaminar space of L5-S1. Once the trajectory was established, the needle was visualized in the lateral view and advanced using loss of resistance technique. Once in the desired position, 1ml contrast was injected to confirm placement and there was no vascular uptake nor intrathecal spread.  The medication was then injected slowly. The patient tolerated the procedure well.      The patient was monitored after the procedure.   They were given post-procedure and discharge instructions to follow at home.  The patient was discharged in a stable condition.      
54

## 2023-01-11 ENCOUNTER — TELEPHONE (OUTPATIENT)
Dept: FAMILY MEDICINE | Facility: CLINIC | Age: 88
End: 2023-01-11

## 2023-01-11 NOTE — TELEPHONE ENCOUNTER
----- Message from Stephani Diaz sent at 1/11/2023  8:27 AM CST -----  Working on future appointments Medicare states this patient has passed.  11/28/2022 canceled appointment FYI

## (undated) DEVICE — GLOVE PROTEXIS PI CLASSIC 6.5

## (undated) DEVICE — NDL SAFETY 25G X 1.5 ECLIPSE

## (undated) DEVICE — SYS LABEL CORRECT MED

## (undated) DEVICE — NDL HYPODERMIC BLUNT 18G 1.5IN

## (undated) DEVICE — SYR DISP LL 5CC

## (undated) DEVICE — APPLICATOR CHLORAPREP CLR 10.5

## (undated) DEVICE — GLOVE PROTEXIS PI CLASSIC 7.5

## (undated) DEVICE — NDL TUOHY EPIDURAL 20G X 3.5

## (undated) DEVICE — TUBING MINIBORE EXTENSION

## (undated) DEVICE — SYR GLASS 5CC LUER LOK